# Patient Record
Sex: MALE | Race: WHITE | NOT HISPANIC OR LATINO | URBAN - METROPOLITAN AREA
[De-identification: names, ages, dates, MRNs, and addresses within clinical notes are randomized per-mention and may not be internally consistent; named-entity substitution may affect disease eponyms.]

---

## 2018-06-29 ENCOUNTER — INPATIENT (INPATIENT)
Facility: HOSPITAL | Age: 23
LOS: 7 days | Discharge: HOME | DRG: 163 | End: 2018-07-07
Attending: THORACIC SURGERY (CARDIOTHORACIC VASCULAR SURGERY) | Admitting: THORACIC SURGERY (CARDIOTHORACIC VASCULAR SURGERY)
Payer: MEDICAID

## 2018-06-29 VITALS
SYSTOLIC BLOOD PRESSURE: 130 MMHG | DIASTOLIC BLOOD PRESSURE: 70 MMHG | HEART RATE: 86 BPM | WEIGHT: 119.93 LBS | RESPIRATION RATE: 19 BRPM | OXYGEN SATURATION: 97 %

## 2018-06-29 DIAGNOSIS — J93.9 PNEUMOTHORAX, UNSPECIFIED: ICD-10-CM

## 2018-06-29 LAB
ALBUMIN SERPL ELPH-MCNC: 4.9 G/DL — SIGNIFICANT CHANGE UP (ref 3.3–5)
ALP SERPL-CCNC: 44 U/L — SIGNIFICANT CHANGE UP (ref 40–120)
ALT FLD-CCNC: 14 U/L — SIGNIFICANT CHANGE UP (ref 10–45)
ANION GAP SERPL CALC-SCNC: 12 MMOL/L — SIGNIFICANT CHANGE UP (ref 5–17)
APTT BLD: 31.2 SEC — SIGNIFICANT CHANGE UP (ref 27.5–37.4)
AST SERPL-CCNC: 17 U/L — SIGNIFICANT CHANGE UP (ref 10–40)
BASOPHILS # BLD AUTO: 0 K/UL — SIGNIFICANT CHANGE UP (ref 0–0.2)
BASOPHILS NFR BLD AUTO: 0.1 % — SIGNIFICANT CHANGE UP (ref 0–2)
BILIRUB SERPL-MCNC: 1 MG/DL — SIGNIFICANT CHANGE UP (ref 0.2–1.2)
BLD GP AB SCN SERPL QL: NEGATIVE — SIGNIFICANT CHANGE UP
BUN SERPL-MCNC: 20 MG/DL — SIGNIFICANT CHANGE UP (ref 7–23)
CALCIUM SERPL-MCNC: 9.2 MG/DL — SIGNIFICANT CHANGE UP (ref 8.4–10.5)
CHLORIDE SERPL-SCNC: 101 MMOL/L — SIGNIFICANT CHANGE UP (ref 96–108)
CO2 SERPL-SCNC: 26 MMOL/L — SIGNIFICANT CHANGE UP (ref 22–31)
CREAT SERPL-MCNC: 1.23 MG/DL — SIGNIFICANT CHANGE UP (ref 0.5–1.3)
EOSINOPHIL # BLD AUTO: 0 K/UL — SIGNIFICANT CHANGE UP (ref 0–0.5)
EOSINOPHIL NFR BLD AUTO: 0.2 % — SIGNIFICANT CHANGE UP (ref 0–6)
GLUCOSE SERPL-MCNC: 110 MG/DL — HIGH (ref 70–99)
HCT VFR BLD CALC: 44.6 % — SIGNIFICANT CHANGE UP (ref 39–50)
HGB BLD-MCNC: 15.1 G/DL — SIGNIFICANT CHANGE UP (ref 13–17)
INR BLD: 1.23 RATIO — HIGH (ref 0.88–1.16)
LYMPHOCYTES # BLD AUTO: 0.6 K/UL — LOW (ref 1–3.3)
LYMPHOCYTES # BLD AUTO: 5.3 % — LOW (ref 13–44)
MCHC RBC-ENTMCNC: 31.2 PG — SIGNIFICANT CHANGE UP (ref 27–34)
MCHC RBC-ENTMCNC: 33.9 GM/DL — SIGNIFICANT CHANGE UP (ref 32–36)
MCV RBC AUTO: 92 FL — SIGNIFICANT CHANGE UP (ref 80–100)
MONOCYTES # BLD AUTO: 0.6 K/UL — SIGNIFICANT CHANGE UP (ref 0–0.9)
MONOCYTES NFR BLD AUTO: 5.7 % — SIGNIFICANT CHANGE UP (ref 2–14)
NEUTROPHILS # BLD AUTO: 10.1 K/UL — HIGH (ref 1.8–7.4)
NEUTROPHILS NFR BLD AUTO: 88.7 % — HIGH (ref 43–77)
PLATELET # BLD AUTO: 226 K/UL — SIGNIFICANT CHANGE UP (ref 150–400)
POTASSIUM SERPL-MCNC: 4.4 MMOL/L — SIGNIFICANT CHANGE UP (ref 3.5–5.3)
POTASSIUM SERPL-SCNC: 4.4 MMOL/L — SIGNIFICANT CHANGE UP (ref 3.5–5.3)
PROT SERPL-MCNC: 7.7 G/DL — SIGNIFICANT CHANGE UP (ref 6–8.3)
PROTHROM AB SERPL-ACNC: 13.3 SEC — HIGH (ref 9.8–12.7)
RBC # BLD: 4.85 M/UL — SIGNIFICANT CHANGE UP (ref 4.2–5.8)
RBC # FLD: 11.5 % — SIGNIFICANT CHANGE UP (ref 10.3–14.5)
RH IG SCN BLD-IMP: POSITIVE — SIGNIFICANT CHANGE UP
RH IG SCN BLD-IMP: POSITIVE — SIGNIFICANT CHANGE UP
SODIUM SERPL-SCNC: 139 MMOL/L — SIGNIFICANT CHANGE UP (ref 135–145)
WBC # BLD: 11.4 K/UL — HIGH (ref 3.8–10.5)
WBC # FLD AUTO: 11.4 K/UL — HIGH (ref 3.8–10.5)

## 2018-06-29 PROCEDURE — 71045 X-RAY EXAM CHEST 1 VIEW: CPT | Mod: 26,59

## 2018-06-29 PROCEDURE — 71250 CT THORAX DX C-: CPT | Mod: 26

## 2018-06-29 PROCEDURE — 99285 EMERGENCY DEPT VISIT HI MDM: CPT | Mod: 25

## 2018-06-29 PROCEDURE — 71046 X-RAY EXAM CHEST 2 VIEWS: CPT | Mod: 26

## 2018-06-29 PROCEDURE — 32556 INSERT CATH PLEURA W/O IMAGE: CPT

## 2018-06-29 RX ORDER — MORPHINE SULFATE 50 MG/1
6 CAPSULE, EXTENDED RELEASE ORAL ONCE
Qty: 0 | Refills: 0 | Status: DISCONTINUED | OUTPATIENT
Start: 2018-06-29 | End: 2018-06-29

## 2018-06-29 RX ORDER — ACETAMINOPHEN 500 MG
650 TABLET ORAL EVERY 6 HOURS
Qty: 0 | Refills: 0 | Status: DISCONTINUED | OUTPATIENT
Start: 2018-06-29 | End: 2018-07-03

## 2018-06-29 RX ORDER — OXYCODONE HYDROCHLORIDE 5 MG/1
5 TABLET ORAL EVERY 4 HOURS
Qty: 0 | Refills: 0 | Status: DISCONTINUED | OUTPATIENT
Start: 2018-06-29 | End: 2018-06-30

## 2018-06-29 RX ORDER — ENOXAPARIN SODIUM 100 MG/ML
40 INJECTION SUBCUTANEOUS DAILY
Qty: 0 | Refills: 0 | Status: DISCONTINUED | OUTPATIENT
Start: 2018-06-29 | End: 2018-07-02

## 2018-06-29 RX ORDER — MORPHINE SULFATE 50 MG/1
4 CAPSULE, EXTENDED RELEASE ORAL ONCE
Qty: 0 | Refills: 0 | Status: DISCONTINUED | OUTPATIENT
Start: 2018-06-29 | End: 2018-06-29

## 2018-06-29 RX ORDER — ENOXAPARIN SODIUM 100 MG/ML
40 INJECTION SUBCUTANEOUS DAILY
Qty: 0 | Refills: 0 | Status: DISCONTINUED | OUTPATIENT
Start: 2018-06-29 | End: 2018-06-29

## 2018-06-29 RX ORDER — ACETAMINOPHEN 500 MG
1000 TABLET ORAL ONCE
Qty: 0 | Refills: 0 | Status: COMPLETED | OUTPATIENT
Start: 2018-06-29 | End: 2018-06-29

## 2018-06-29 RX ORDER — ENOXAPARIN SODIUM 100 MG/ML
40 INJECTION SUBCUTANEOUS EVERY 12 HOURS
Qty: 0 | Refills: 0 | Status: DISCONTINUED | OUTPATIENT
Start: 2018-06-29 | End: 2018-06-29

## 2018-06-29 RX ORDER — HYDROMORPHONE HYDROCHLORIDE 2 MG/ML
0.5 INJECTION INTRAMUSCULAR; INTRAVENOUS; SUBCUTANEOUS ONCE
Qty: 0 | Refills: 0 | Status: DISCONTINUED | OUTPATIENT
Start: 2018-06-29 | End: 2018-06-29

## 2018-06-29 RX ADMIN — Medication 1000 MILLIGRAM(S): at 10:37

## 2018-06-29 RX ADMIN — ENOXAPARIN SODIUM 40 MILLIGRAM(S): 100 INJECTION SUBCUTANEOUS at 22:28

## 2018-06-29 RX ADMIN — OXYCODONE HYDROCHLORIDE 5 MILLIGRAM(S): 5 TABLET ORAL at 23:27

## 2018-06-29 RX ADMIN — MORPHINE SULFATE 4 MILLIGRAM(S): 50 CAPSULE, EXTENDED RELEASE ORAL at 13:26

## 2018-06-29 RX ADMIN — Medication 400 MILLIGRAM(S): at 10:09

## 2018-06-29 RX ADMIN — HYDROMORPHONE HYDROCHLORIDE 0.5 MILLIGRAM(S): 2 INJECTION INTRAMUSCULAR; INTRAVENOUS; SUBCUTANEOUS at 20:59

## 2018-06-29 RX ADMIN — OXYCODONE HYDROCHLORIDE 5 MILLIGRAM(S): 5 TABLET ORAL at 22:27

## 2018-06-29 RX ADMIN — Medication 650 MILLIGRAM(S): at 17:16

## 2018-06-29 RX ADMIN — Medication 650 MILLIGRAM(S): at 17:45

## 2018-06-29 RX ADMIN — OXYCODONE HYDROCHLORIDE 5 MILLIGRAM(S): 5 TABLET ORAL at 17:45

## 2018-06-29 RX ADMIN — HYDROMORPHONE HYDROCHLORIDE 0.5 MILLIGRAM(S): 2 INJECTION INTRAMUSCULAR; INTRAVENOUS; SUBCUTANEOUS at 20:39

## 2018-06-29 RX ADMIN — OXYCODONE HYDROCHLORIDE 5 MILLIGRAM(S): 5 TABLET ORAL at 17:16

## 2018-06-29 NOTE — ED ADULT NURSE NOTE - OBJECTIVE STATEMENT
21 y/o male with PMH of spontaneous pneumothorax 4 years prior presenting to ED back & chest pain on inspiration & expiration. Pt states "i'm not sure if it's related but I got a tattoo on my back 4 days ago. my chest feels like i've just been punched. I took 162 ASA this morning, it brought my pain down from a 7 to a 4." As per EMS: pt diaphoretic on arrival of EMS.  Upon exam, pt A&Ox3, lungs cta bilaterally breath sounds diminished on left upper lobe, no difficulty speaking in complete sentences, s1s2 heart sounds heard, hesitant to take a deep breath, SPO2 98% on room air. Pt denies sob, ha, n/v/d, abdominal pain, f/c, urinary symptoms, hematuria. Awaits MD vazquez. 23 y/o male with PMH of spontaneous pneumothorax 4 years prior presenting to ED back, arm, & chest pain on inspiration & expiration x yesterday. Pt states "i'm not sure if it's related but I got a tattoo on my back 4 days ago. my chest feels like i've just been punched. I took 162 ASA this morning, it brought my pain down from a 7 to a 4." As per EMS: pt diaphoretic on arrival of EMS.  Upon exam, pt A&Ox3, lungs cta bilaterally breath sounds diminished on left upper lobe, no difficulty speaking in complete sentences, s1s2 heart sounds heard, hesitant to take a deep breath, SPO2 98% on room air. Pt denies sob, ha, n/v/d, abdominal pain, f/c, urinary symptoms, hematuria. Awaits MD vazquez.

## 2018-06-29 NOTE — ED ADULT NURSE REASSESSMENT NOTE - NS ED NURSE REASSESS COMMENT FT1
Pt remains stable S/P chest tube insertion by MD. Non-rebreather placed on patient during procedure, removed post procedure SPO2 100% on room air. Chest tube water seal chamber bubbling, connected to low wall suction. Awaits MD dispo.

## 2018-06-29 NOTE — ED ADULT NURSE REASSESSMENT NOTE - NS ED NURSE REASSESS COMMENT FT1
Pt remains stable in ED. Medicated for pain. Admitted to medicine for pneumothorax on left. Awaits bed.

## 2018-06-29 NOTE — H&P ADULT - HISTORY OF PRESENT ILLNESS
22F only PMH spontaneous pneumothorax in 2014 in [place] treated at the time with Heimlich valve who presents with left chest and back pain since yesterday. It started suddenly, is sharp and worsens with deep breaths. He denies trauma. He denies fever/chills. CXR in ED found moderate L pneumothorax. Previous workup did not reveal an obvious cause. He denies family hx. He does not smoke.  ED placed a pigtail with small residual apical pneumothorax.

## 2018-06-29 NOTE — ED PROVIDER NOTE - PROGRESS NOTE DETAILS
James Whatley PGY1: CXR significant for L upper pneumothorax, d/w patient agreed to pigtail catheter; consulted surgery James Whatley PGY1: pigtail catheter placed, post-procedural cxr shows interval improvement of pneumothorax; paged surgery James Whatley PGY1: admitted to surgery

## 2018-06-29 NOTE — H&P ADULT - NSHPLABSRESULTS_GEN_ALL_CORE
CBC Full  -  ( 29 Jun 2018 10:56 )  WBC Count : 11.4 K/uL  Hemoglobin : 15.1 g/dL  Hematocrit : 44.6 %  Platelet Count - Automated : 226 K/uL  Mean Cell Volume : 92.0 fl  Mean Cell Hemoglobin : 31.2 pg  Mean Cell Hemoglobin Concentration : 33.9 gm/dL  Auto Neutrophil # : 10.1 K/uL  Auto Lymphocyte # : 0.6 K/uL  Auto Monocyte # : 0.6 K/uL  Auto Eosinophil # : 0.0 K/uL  Auto Basophil # : 0.0 K/uL  Auto Neutrophil % : 88.7 %  Auto Lymphocyte % : 5.3 %  Auto Monocyte % : 5.7 %  Auto Eosinophil % : 0.2 %  Auto Basophil % : 0.1 %    06-29    139  |  101  |  20  ----------------------------<  110<H>  4.4   |  26  |  1.23    Ca    9.2      29 Jun 2018 10:56    TPro  7.7  /  Alb  4.9  /  TBili  1.0  /  DBili  x   /  AST  17  /  ALT  14  /  AlkPhos  44  06-29    < from: Xray Chest 1 View- PORTABLE-Urgent (06.29.18 @ 13:00) >    INTERPRETATION:    A single chest x-ray was obtained on June 29, 2018.    Indication: Status post chest tube placement.    Impression:    The heartis normal in size. A pigtail catheter was placed on the left   and there is reexpansion of the left pneumothorax. Residual apical   pneumothorax is still present. The right lung is clear.      < end of copied text >

## 2018-06-29 NOTE — ED PROVIDER NOTE - MEDICAL DECISION MAKING DETAILS
23 yo M w/ sob, chest pain consistent w/ prior episode spontaneous pneumothorax; vss, will check cxr, draw preop labs, give sypmtomatic relief, reassess

## 2018-06-29 NOTE — ED PROVIDER NOTE - PHYSICAL EXAMINATION
Jerez:  General: No distress.  Mentation at baseline.   HEENT: WNL  Chest/Lungs: CTAB, No wheeze, No retractions, No increased work of breathing, Normal rate  Heart: S1S2 RRR, No M/R/G, Pules equal Bilaterally in upper and lower extremities distally  Abd: soft, NT/ND, No guarding, No rebound.  No hernias, no palpable masses.  Extrem: FROM in all joints, no significant edema noted, No ulcers.  Cap refil < 2sec.  Skin: No rash noted, warm dry.  Neuro:  Grossly normal.  No difficulty ambulating. No focal deficits.  Psychiatric: No evidence of delusions. No SI/HI. Jerez:  General: No distress.  Mentation at baseline.   HEENT: WNL  Chest/Lungs: decreased breath sounds in left upper chest, No wheeze, No retractions, No increased work of breathing, Normal rate  Heart: S1S2 RRR, No M/R/G, Pules equal Bilaterally in upper and lower extremities distally  Abd: soft, NT/ND, No guarding, No rebound.  No hernias, no palpable masses.  Extrem: FROM in all joints, no significant edema noted, No ulcers.  Cap refil < 2sec.  Skin: No rash noted, warm dry.  Neuro:  Grossly normal.  No difficulty ambulating. No focal deficits.  Psychiatric: No evidence of delusions. No SI/HI.

## 2018-06-29 NOTE — H&P ADULT - NSHPPHYSICALEXAM_GEN_ALL_CORE
NAD, awake and alert  Respirations nonlabored at rest  chest wall nontender  Diminished breath sounds L upper  Abdomen soft, nontender, nondistended  No guarding or rebound tenderness   Extremities warm

## 2018-06-29 NOTE — H&P ADULT - ASSESSMENT
22M w/ recurrent spontaneous pneumothorax    - admit to Thoracic surgery  - pigtail to LCWS  - pain control  - incentive spirometry  - VTE ppx  - can have diet for now  - will eval for pleurodesis  - AM CXR  - d/w Dr. Sidra Cleveland MD  [id number]

## 2018-06-29 NOTE — ED PROVIDER NOTE - ATTENDING CONTRIBUTION TO CARE
Solitario:  I have independently evaluated the patient and have documented in the appropriate sections above.  I agree with the exam and plan as noted above.

## 2018-06-29 NOTE — ED PROVIDER NOTE - OBJECTIVE STATEMENT
Jerez: here with chest pain and hx of spontaneous pneumothorax  pain with deep breath Jerez: here with chest pain and hx of spontaneous pneumothorax  pain with deep breath    23 yo M w/ pmh spontaneous pneumothorax (2014) p/w L upper back pain radiating to L chest that started 2pm yesterday and has been worsening, w/ sob. Denies cough, fever, recent travel. No trauma to area, increased exertion. No family hx of pneumothorax.

## 2018-06-29 NOTE — ED PROVIDER NOTE - CADM POA PRESS ULCER
Vaccine Information Statement(s) for was given today. This has been reviewed, questions answered, and verbal consent given by Patient for injection(s) and administration of Anthrax and Influenza (Inactivated).    1. Does the patient have a moderate to severe fever?  No  2. Has the patient had a serious reaction to a flu shot before?   No  3. Has the patient ever had [place] Syndrome within 6 weeks of a previous flu shot?  No  4. Does the patient have a serious allergy to eggs?  No  5. Is the patient less that 6 months of age?  No    Patient is eligible to receive the vaccine based on all questions being answered as 'No'.          Patient tolerated without incident. See immunization grid for documentation.   No

## 2018-06-30 DIAGNOSIS — J93.83 OTHER PNEUMOTHORAX: ICD-10-CM

## 2018-06-30 LAB
ANION GAP SERPL CALC-SCNC: 12 MMOL/L — SIGNIFICANT CHANGE UP (ref 5–17)
BUN SERPL-MCNC: 16 MG/DL — SIGNIFICANT CHANGE UP (ref 7–23)
CALCIUM SERPL-MCNC: 9.2 MG/DL — SIGNIFICANT CHANGE UP (ref 8.4–10.5)
CHLORIDE SERPL-SCNC: 100 MMOL/L — SIGNIFICANT CHANGE UP (ref 96–108)
CO2 SERPL-SCNC: 25 MMOL/L — SIGNIFICANT CHANGE UP (ref 22–31)
CREAT SERPL-MCNC: 1.39 MG/DL — HIGH (ref 0.5–1.3)
GLUCOSE SERPL-MCNC: 99 MG/DL — SIGNIFICANT CHANGE UP (ref 70–99)
HCT VFR BLD CALC: 38.2 % — LOW (ref 39–50)
HGB BLD-MCNC: 13.5 G/DL — SIGNIFICANT CHANGE UP (ref 13–17)
MAGNESIUM SERPL-MCNC: 1.8 MG/DL — SIGNIFICANT CHANGE UP (ref 1.6–2.6)
MCHC RBC-ENTMCNC: 31.9 PG — SIGNIFICANT CHANGE UP (ref 27–34)
MCHC RBC-ENTMCNC: 35.3 GM/DL — SIGNIFICANT CHANGE UP (ref 32–36)
MCV RBC AUTO: 90.3 FL — SIGNIFICANT CHANGE UP (ref 80–100)
PHOSPHATE SERPL-MCNC: 1.7 MG/DL — LOW (ref 2.5–4.5)
PLATELET # BLD AUTO: 201 K/UL — SIGNIFICANT CHANGE UP (ref 150–400)
POTASSIUM SERPL-MCNC: 4 MMOL/L — SIGNIFICANT CHANGE UP (ref 3.5–5.3)
POTASSIUM SERPL-SCNC: 4 MMOL/L — SIGNIFICANT CHANGE UP (ref 3.5–5.3)
RBC # BLD: 4.23 M/UL — SIGNIFICANT CHANGE UP (ref 4.2–5.8)
RBC # FLD: 12.7 % — SIGNIFICANT CHANGE UP (ref 10.3–14.5)
SODIUM SERPL-SCNC: 137 MMOL/L — SIGNIFICANT CHANGE UP (ref 135–145)
WBC # BLD: 10.6 K/UL — HIGH (ref 3.8–10.5)
WBC # FLD AUTO: 10.6 K/UL — HIGH (ref 3.8–10.5)

## 2018-06-30 PROCEDURE — 71045 X-RAY EXAM CHEST 1 VIEW: CPT | Mod: 26

## 2018-06-30 RX ORDER — ACETAMINOPHEN 500 MG
650 TABLET ORAL EVERY 6 HOURS
Qty: 0 | Refills: 0 | Status: DISCONTINUED | OUTPATIENT
Start: 2018-06-30 | End: 2018-07-03

## 2018-06-30 RX ORDER — HYDROMORPHONE HYDROCHLORIDE 2 MG/ML
0.5 INJECTION INTRAMUSCULAR; INTRAVENOUS; SUBCUTANEOUS ONCE
Qty: 0 | Refills: 0 | Status: DISCONTINUED | OUTPATIENT
Start: 2018-06-30 | End: 2018-06-30

## 2018-06-30 RX ORDER — OXYCODONE HYDROCHLORIDE 5 MG/1
10 TABLET ORAL EVERY 4 HOURS
Qty: 0 | Refills: 0 | Status: DISCONTINUED | OUTPATIENT
Start: 2018-06-30 | End: 2018-07-03

## 2018-06-30 RX ADMIN — HYDROMORPHONE HYDROCHLORIDE 0.5 MILLIGRAM(S): 2 INJECTION INTRAMUSCULAR; INTRAVENOUS; SUBCUTANEOUS at 08:10

## 2018-06-30 RX ADMIN — OXYCODONE HYDROCHLORIDE 10 MILLIGRAM(S): 5 TABLET ORAL at 11:33

## 2018-06-30 RX ADMIN — OXYCODONE HYDROCHLORIDE 10 MILLIGRAM(S): 5 TABLET ORAL at 22:25

## 2018-06-30 RX ADMIN — OXYCODONE HYDROCHLORIDE 10 MILLIGRAM(S): 5 TABLET ORAL at 18:00

## 2018-06-30 RX ADMIN — OXYCODONE HYDROCHLORIDE 5 MILLIGRAM(S): 5 TABLET ORAL at 04:41

## 2018-06-30 RX ADMIN — OXYCODONE HYDROCHLORIDE 5 MILLIGRAM(S): 5 TABLET ORAL at 08:20

## 2018-06-30 RX ADMIN — OXYCODONE HYDROCHLORIDE 10 MILLIGRAM(S): 5 TABLET ORAL at 21:41

## 2018-06-30 RX ADMIN — OXYCODONE HYDROCHLORIDE 5 MILLIGRAM(S): 5 TABLET ORAL at 07:51

## 2018-06-30 RX ADMIN — OXYCODONE HYDROCHLORIDE 10 MILLIGRAM(S): 5 TABLET ORAL at 17:27

## 2018-06-30 RX ADMIN — OXYCODONE HYDROCHLORIDE 10 MILLIGRAM(S): 5 TABLET ORAL at 12:00

## 2018-06-30 RX ADMIN — HYDROMORPHONE HYDROCHLORIDE 0.5 MILLIGRAM(S): 2 INJECTION INTRAMUSCULAR; INTRAVENOUS; SUBCUTANEOUS at 08:25

## 2018-06-30 RX ADMIN — OXYCODONE HYDROCHLORIDE 5 MILLIGRAM(S): 5 TABLET ORAL at 05:48

## 2018-06-30 RX ADMIN — Medication 650 MILLIGRAM(S): at 21:57

## 2018-06-30 RX ADMIN — ENOXAPARIN SODIUM 40 MILLIGRAM(S): 100 INJECTION SUBCUTANEOUS at 11:29

## 2018-06-30 NOTE — PROGRESS NOTE ADULT - SUBJECTIVE AND OBJECTIVE BOX
VITAL SIGNS    Vital Signs Last 24 Hrs  T(C): 37.7 (06-30-18 @ 04:08), Max: 37.8 (06-29-18 @ 21:23)  T(F): 99.8 (06-30-18 @ 04:08), Max: 100 (06-29-18 @ 21:23)  HR: 89 (06-30-18 @ 04:08) (70 - 89)  BP: 130/67 (06-30-18 @ 04:08) (107/64 - 130/67)  RR: 20 (06-30-18 @ 04:08) (19 - 24)  SpO2: 96% (06-30-18 @ 04:08) (92% - 99%)            06-29 @ 07:01  -  06-30 @ 07:00  --------------------------------------------------------  IN: 480 mL / OUT: 653 mL / NET: -173 mL       Daily Height in cm: 172.72 (29 Jun 2018 16:44)    Daily   Admit Wt: Drug Dosing Weight  Height (cm): 172.72 (29 Jun 2018 16:44)  Weight (kg): 67.4 (29 Jun 2018 16:44)  BMI (kg/m2): 22.6 (29 Jun 2018 16:44)  BSA (m2): 1.8 (29 Jun 2018 16:44)    Bilirubin Total, Serum: 1.0 mg/dL (06-29 @ 10:56)    MEDICATIONS  acetaminophen   Tablet. 650 milliGRAM(s) Oral every 6 hours PRN  enoxaparin Injectable 40 milliGRAM(s) SubCutaneous daily  oxyCODONE    IR 10 milliGRAM(s) Oral every 4 hours PRN    PHYSICAL EXAM  Subjective: c/o pain to chest tube site, lws no airleak  Neurology: alert and oriented x 3, nonfocal, no gross deficits  CV : CTS  Sternal Wound :  CDI , Stable  Lungs:  Abdomen: soft, NT,ND, ( )BM  :  voiding  Extremities:       LABS  06-30    137  |  100  |  16  ----------------------------<  99  4.0   |  25  |  1.39<H>    Ca    9.2      30 Jun 2018 05:41  Phos  1.7     06-30  Mg     1.8     06-30    TPro  7.7  /  Alb  4.9  /  TBili  1.0  /  DBili  x   /  AST  17  /  ALT  14  /  AlkPhos  44  06-29                                 13.5   10.60 )-----------( 201      ( 30 Jun 2018 08:39 )             38.2          PT/INR - ( 29 Jun 2018 10:56 )   PT: 13.3 sec;   INR: 1.23 ratio         PTT - ( 29 Jun 2018 10:56 )  PTT:31.2 sec       PAST MEDICAL & SURGICAL HISTORY:  Pneumothorax  No significant past surgical history VITAL SIGNS    Vital Signs Last 24 Hrs  T(C): 37.7 (06-30-18 @ 04:08), Max: 37.8 (06-29-18 @ 21:23)  T(F): 99.8 (06-30-18 @ 04:08), Max: 100 (06-29-18 @ 21:23)  HR: 89 (06-30-18 @ 04:08) (70 - 89)  BP: 130/67 (06-30-18 @ 04:08) (107/64 - 130/67)  RR: 20 (06-30-18 @ 04:08) (19 - 24)  SpO2: 96% (06-30-18 @ 04:08) (92% - 99%)            06-29 @ 07:01  -  06-30 @ 07:00  --------------------------------------------------------  IN: 480 mL / OUT: 653 mL / NET: -173 mL       Daily Height in cm: 172.72 (29 Jun 2018 16:44)    Daily   Admit Wt: Drug Dosing Weight  Height (cm): 172.72 (29 Jun 2018 16:44)  Weight (kg): 67.4 (29 Jun 2018 16:44)  BMI (kg/m2): 22.6 (29 Jun 2018 16:44)  BSA (m2): 1.8 (29 Jun 2018 16:44)    Bilirubin Total, Serum: 1.0 mg/dL (06-29 @ 10:56)    MEDICATIONS  acetaminophen   Tablet. 650 milliGRAM(s) Oral every 6 hours PRN  enoxaparin Injectable 40 milliGRAM(s) SubCutaneous daily  oxyCODONE    IR 10 milliGRAM(s) Oral every 4 hours PRN    PHYSICAL EXAM  Subjective: c/o pain to chest tube site, lws no airleak  Neurology: alert and oriented x 3, nonfocal, no gross deficits  CV :S1S2  Lungs: CTA   Abdomen: soft, NT,ND, (+)BM  :  voiding  Extremities: -c/c/e      LABS  06-30    137  |  100  |  16  ----------------------------<  99  4.0   |  25  |  1.39<H>    Ca    9.2      30 Jun 2018 05:41  Phos  1.7     06-30  Mg     1.8     06-30    TPro  7.7  /  Alb  4.9  /  TBili  1.0  /  DBili  x   /  AST  17  /  ALT  14  /  AlkPhos  44  06-29                                 13.5   10.60 )-----------( 201      ( 30 Jun 2018 08:39 )             38.2          PT/INR - ( 29 Jun 2018 10:56 )   PT: 13.3 sec;   INR: 1.23 ratio         PTT - ( 29 Jun 2018 10:56 )  PTT:31.2 sec       PAST MEDICAL & SURGICAL HISTORY:  Pneumothorax  No significant past surgical history

## 2018-07-01 LAB
ANION GAP SERPL CALC-SCNC: 14 MMOL/L — SIGNIFICANT CHANGE UP (ref 5–17)
BUN SERPL-MCNC: 18 MG/DL — SIGNIFICANT CHANGE UP (ref 7–23)
CALCIUM SERPL-MCNC: 8.9 MG/DL — SIGNIFICANT CHANGE UP (ref 8.4–10.5)
CHLORIDE SERPL-SCNC: 97 MMOL/L — SIGNIFICANT CHANGE UP (ref 96–108)
CO2 SERPL-SCNC: 27 MMOL/L — SIGNIFICANT CHANGE UP (ref 22–31)
CREAT SERPL-MCNC: 1.4 MG/DL — HIGH (ref 0.5–1.3)
GLUCOSE SERPL-MCNC: 94 MG/DL — SIGNIFICANT CHANGE UP (ref 70–99)
HCT VFR BLD CALC: 39.7 % — SIGNIFICANT CHANGE UP (ref 39–50)
HGB BLD-MCNC: 14 G/DL — SIGNIFICANT CHANGE UP (ref 13–17)
MCHC RBC-ENTMCNC: 32.9 PG — SIGNIFICANT CHANGE UP (ref 27–34)
MCHC RBC-ENTMCNC: 35.4 GM/DL — SIGNIFICANT CHANGE UP (ref 32–36)
MCV RBC AUTO: 93.2 FL — SIGNIFICANT CHANGE UP (ref 80–100)
PLATELET # BLD AUTO: 203 K/UL — SIGNIFICANT CHANGE UP (ref 150–400)
POTASSIUM SERPL-MCNC: 4.4 MMOL/L — SIGNIFICANT CHANGE UP (ref 3.5–5.3)
POTASSIUM SERPL-SCNC: 4.4 MMOL/L — SIGNIFICANT CHANGE UP (ref 3.5–5.3)
RBC # BLD: 4.26 M/UL — SIGNIFICANT CHANGE UP (ref 4.2–5.8)
RBC # FLD: 11.3 % — SIGNIFICANT CHANGE UP (ref 10.3–14.5)
SODIUM SERPL-SCNC: 138 MMOL/L — SIGNIFICANT CHANGE UP (ref 135–145)
WBC # BLD: 9 K/UL — SIGNIFICANT CHANGE UP (ref 3.8–10.5)
WBC # FLD AUTO: 9 K/UL — SIGNIFICANT CHANGE UP (ref 3.8–10.5)

## 2018-07-01 PROCEDURE — 71045 X-RAY EXAM CHEST 1 VIEW: CPT | Mod: 26

## 2018-07-01 RX ORDER — SODIUM,POTASSIUM PHOSPHATES 278-250MG
1 POWDER IN PACKET (EA) ORAL
Qty: 0 | Refills: 0 | Status: COMPLETED | OUTPATIENT
Start: 2018-07-01 | End: 2018-07-02

## 2018-07-01 RX ADMIN — Medication 1 TABLET(S): at 21:17

## 2018-07-01 RX ADMIN — ENOXAPARIN SODIUM 40 MILLIGRAM(S): 100 INJECTION SUBCUTANEOUS at 11:49

## 2018-07-01 RX ADMIN — OXYCODONE HYDROCHLORIDE 10 MILLIGRAM(S): 5 TABLET ORAL at 18:24

## 2018-07-01 RX ADMIN — Medication 1 TABLET(S): at 18:02

## 2018-07-01 RX ADMIN — OXYCODONE HYDROCHLORIDE 10 MILLIGRAM(S): 5 TABLET ORAL at 06:52

## 2018-07-01 RX ADMIN — OXYCODONE HYDROCHLORIDE 10 MILLIGRAM(S): 5 TABLET ORAL at 15:00

## 2018-07-01 RX ADMIN — OXYCODONE HYDROCHLORIDE 10 MILLIGRAM(S): 5 TABLET ORAL at 06:32

## 2018-07-01 RX ADMIN — OXYCODONE HYDROCHLORIDE 10 MILLIGRAM(S): 5 TABLET ORAL at 14:34

## 2018-07-01 RX ADMIN — OXYCODONE HYDROCHLORIDE 10 MILLIGRAM(S): 5 TABLET ORAL at 23:44

## 2018-07-01 RX ADMIN — OXYCODONE HYDROCHLORIDE 10 MILLIGRAM(S): 5 TABLET ORAL at 23:14

## 2018-07-01 RX ADMIN — OXYCODONE HYDROCHLORIDE 10 MILLIGRAM(S): 5 TABLET ORAL at 11:30

## 2018-07-01 RX ADMIN — OXYCODONE HYDROCHLORIDE 10 MILLIGRAM(S): 5 TABLET ORAL at 10:55

## 2018-07-01 RX ADMIN — OXYCODONE HYDROCHLORIDE 10 MILLIGRAM(S): 5 TABLET ORAL at 19:00

## 2018-07-01 NOTE — PROGRESS NOTE ADULT - SUBJECTIVE AND OBJECTIVE BOX
VITAL SIGNS    Telemetry:    Vital Signs Last 24 Hrs  T(C): 37.8 (18 @ 13:29), Max: 38.1 (18 @ 20:50)  T(F): 100.1 (18 @ 13:29), Max: 100.5 (18 @ 20:50)  HR: 77 (18 @ 13:29) (77 - 90)  BP: 117/73 (18 @ 13:29) (106/63 - 124/68)  RR: 17 (18 @ 13:29) (17 - 18)  SpO2: 96% (18 @ 13:29) (93% - 96%)             @ 07:01  -   @ 07:00  --------------------------------------------------------  IN: 480 mL / OUT: 7 mL / NET: 473 mL       Daily     Daily Weight in k.5 (2018 12:41)  Admit Wt: Drug Dosing Weight  Height (cm): 172.72 (2018 16:44)  Weight (kg): 67.4 (2018 16:44)  BMI (kg/m2): 22.6 (2018 16:44)  BSA (m2): 1.8 (2018 16:44)      CAPILLARY BLOOD GLUCOSE              MEDICATIONS  acetaminophen   Tablet 650 milliGRAM(s) Oral every 6 hours PRN  acetaminophen   Tablet. 650 milliGRAM(s) Oral every 6 hours PRN  enoxaparin Injectable 40 milliGRAM(s) SubCutaneous daily  oxyCODONE    IR 10 milliGRAM(s) Oral every 4 hours PRN      PHYSICAL EXAM  Subjective: NAD  Neurology: alert and oriented x 3, nonfocal, no gross deficits  CV : S1S2  Lungs: left pigtail to lws  Abdomen: soft, NT,ND, (+ )BM  :  voiding  Extremities: -c/c/e      LABS      138  |  97  |  18  ----------------------------<  94  4.4   |  27  |  1.40<H>    Ca    8.9      2018 06:20  Phos  1.7     06-30  Mg     1.8     06-30                                   14.0   9.0   )-----------( 203      ( 2018 06:20 )             39.7                 PAST MEDICAL & SURGICAL HISTORY:  Pneumothorax  No significant past surgical history

## 2018-07-02 ENCOUNTER — TRANSCRIPTION ENCOUNTER (OUTPATIENT)
Age: 23
End: 2018-07-02

## 2018-07-02 LAB
ANION GAP SERPL CALC-SCNC: 12 MMOL/L — SIGNIFICANT CHANGE UP (ref 5–17)
APPEARANCE UR: CLEAR — SIGNIFICANT CHANGE UP
BILIRUB UR-MCNC: NEGATIVE — SIGNIFICANT CHANGE UP
BLD GP AB SCN SERPL QL: NEGATIVE — SIGNIFICANT CHANGE UP
BUN SERPL-MCNC: 16 MG/DL — SIGNIFICANT CHANGE UP (ref 7–23)
CALCIUM SERPL-MCNC: 8.5 MG/DL — SIGNIFICANT CHANGE UP (ref 8.4–10.5)
CHLORIDE SERPL-SCNC: 97 MMOL/L — SIGNIFICANT CHANGE UP (ref 96–108)
CO2 SERPL-SCNC: 28 MMOL/L — SIGNIFICANT CHANGE UP (ref 22–31)
COLOR SPEC: YELLOW — SIGNIFICANT CHANGE UP
CREAT SERPL-MCNC: 1.35 MG/DL — HIGH (ref 0.5–1.3)
DIFF PNL FLD: NEGATIVE — SIGNIFICANT CHANGE UP
GLUCOSE SERPL-MCNC: 95 MG/DL — SIGNIFICANT CHANGE UP (ref 70–99)
GLUCOSE UR QL: NEGATIVE — SIGNIFICANT CHANGE UP
HCT VFR BLD CALC: 36.7 % — LOW (ref 39–50)
HGB BLD-MCNC: 12.9 G/DL — LOW (ref 13–17)
KETONES UR-MCNC: NEGATIVE — SIGNIFICANT CHANGE UP
LEUKOCYTE ESTERASE UR-ACNC: NEGATIVE — SIGNIFICANT CHANGE UP
MCHC RBC-ENTMCNC: 32.4 PG — SIGNIFICANT CHANGE UP (ref 27–34)
MCHC RBC-ENTMCNC: 35.1 GM/DL — SIGNIFICANT CHANGE UP (ref 32–36)
MCV RBC AUTO: 92.5 FL — SIGNIFICANT CHANGE UP (ref 80–100)
NITRITE UR-MCNC: NEGATIVE — SIGNIFICANT CHANGE UP
PH UR: 6.5 — SIGNIFICANT CHANGE UP (ref 5–8)
PHOSPHATE SERPL-MCNC: 3.2 MG/DL — SIGNIFICANT CHANGE UP (ref 2.5–4.5)
PHOSPHATE SERPL-MCNC: 3.3 MG/DL — SIGNIFICANT CHANGE UP (ref 2.5–4.5)
PLATELET # BLD AUTO: 217 K/UL — SIGNIFICANT CHANGE UP (ref 150–400)
POTASSIUM SERPL-MCNC: 4.2 MMOL/L — SIGNIFICANT CHANGE UP (ref 3.5–5.3)
POTASSIUM SERPL-SCNC: 4.2 MMOL/L — SIGNIFICANT CHANGE UP (ref 3.5–5.3)
PROT UR-MCNC: NEGATIVE — SIGNIFICANT CHANGE UP
RBC # BLD: 3.97 M/UL — LOW (ref 4.2–5.8)
RBC # FLD: 11 % — SIGNIFICANT CHANGE UP (ref 10.3–14.5)
RH IG SCN BLD-IMP: POSITIVE — SIGNIFICANT CHANGE UP
SODIUM SERPL-SCNC: 137 MMOL/L — SIGNIFICANT CHANGE UP (ref 135–145)
SP GR SPEC: 1.01 — SIGNIFICANT CHANGE UP (ref 1.01–1.02)
UROBILINOGEN FLD QL: 4 MG/DL
WBC # BLD: 8 K/UL — SIGNIFICANT CHANGE UP (ref 3.8–10.5)
WBC # FLD AUTO: 8 K/UL — SIGNIFICANT CHANGE UP (ref 3.8–10.5)

## 2018-07-02 RX ADMIN — OXYCODONE HYDROCHLORIDE 10 MILLIGRAM(S): 5 TABLET ORAL at 15:00

## 2018-07-02 RX ADMIN — OXYCODONE HYDROCHLORIDE 10 MILLIGRAM(S): 5 TABLET ORAL at 08:26

## 2018-07-02 RX ADMIN — OXYCODONE HYDROCHLORIDE 10 MILLIGRAM(S): 5 TABLET ORAL at 20:15

## 2018-07-02 RX ADMIN — ENOXAPARIN SODIUM 40 MILLIGRAM(S): 100 INJECTION SUBCUTANEOUS at 11:30

## 2018-07-02 RX ADMIN — OXYCODONE HYDROCHLORIDE 10 MILLIGRAM(S): 5 TABLET ORAL at 19:40

## 2018-07-02 RX ADMIN — Medication 1 TABLET(S): at 11:30

## 2018-07-02 RX ADMIN — OXYCODONE HYDROCHLORIDE 10 MILLIGRAM(S): 5 TABLET ORAL at 09:00

## 2018-07-02 RX ADMIN — Medication 1 TABLET(S): at 07:44

## 2018-07-02 RX ADMIN — OXYCODONE HYDROCHLORIDE 10 MILLIGRAM(S): 5 TABLET ORAL at 14:35

## 2018-07-02 NOTE — PROGRESS NOTE ADULT - SUBJECTIVE AND OBJECTIVE BOX
VITAL SIGNS    Vital Signs Last 24 Hrs  T(C): 37.2 (18 @ 04:46), Max: 37.8 (18 @ 23:55)  T(F): 99 (18 @ 04:46), Max: 100.1 (18 @ 23:55)  HR: 72 (18 @ 04:46) (72 - 92)  BP: 102/64 (18 @ 04:46) (102/64 - 116/68)  RR: 18 (18 @ 04:46) (18 - 18)  SpO2: 99% (18 @ 04:46) (93% - 99%)             @ 07:  -   @ 07:00  --------------------------------------------------------  IN: 1077 mL / OUT: 1125 mL / NET: -48 mL     @ :  -   @ 13:41  --------------------------------------------------------  IN: 240 mL / OUT: 0 mL / NET: 240 mL     Daily Weight in k (2018 08:24)  Admit Wt: Drug Dosing Weight  Height (cm): 172.72 (2018 16:44)  Weight (kg): 67.4 (2018 16:44)  BMI (kg/m2): 22.6 (2018 16:44)  BSA (m2): 1.8 (2018 16:44)    MEDICATIONS  acetaminophen   Tablet 650 milliGRAM(s) Oral every 6 hours PRN  acetaminophen   Tablet. 650 milliGRAM(s) Oral every 6 hours PRN  enoxaparin Injectable 40 milliGRAM(s) SubCutaneous daily  oxyCODONE    IR 10 milliGRAM(s) Oral every 4 hours PRN      PHYSICAL EXAM  Subjective: NAD  Neurology: alert and oriented x 3, nonfocal, no gross deficits  CV : S1S2  Lungs: CTA left chest tube to lws  Abdomen: soft, NT,ND, (+)BM  :  voiding  Extremities: -c/c/e     LABS  -    137  |  97  |  16  ----------------------------<  95  4.2   |  28  |  1.35<H>    Ca    8.5      2018 05:54  Phos  3.2     -                                   12.9   8.0   )-----------( 217      ( 2018 05:54 )             36.7                 PAST MEDICAL & SURGICAL HISTORY:  Pneumothorax  No significant past surgical history

## 2018-07-03 ENCOUNTER — APPOINTMENT (OUTPATIENT)
Dept: THORACIC SURGERY | Facility: HOSPITAL | Age: 23
End: 2018-07-03

## 2018-07-03 ENCOUNTER — RESULT REVIEW (OUTPATIENT)
Age: 23
End: 2018-07-03

## 2018-07-03 LAB
ANION GAP SERPL CALC-SCNC: 9 MMOL/L — SIGNIFICANT CHANGE UP (ref 5–17)
BUN SERPL-MCNC: 13 MG/DL — SIGNIFICANT CHANGE UP (ref 7–23)
CALCIUM SERPL-MCNC: 8.8 MG/DL — SIGNIFICANT CHANGE UP (ref 8.4–10.5)
CHLORIDE SERPL-SCNC: 99 MMOL/L — SIGNIFICANT CHANGE UP (ref 96–108)
CO2 SERPL-SCNC: 31 MMOL/L — SIGNIFICANT CHANGE UP (ref 22–31)
CREAT SERPL-MCNC: 1.26 MG/DL — SIGNIFICANT CHANGE UP (ref 0.5–1.3)
GLUCOSE SERPL-MCNC: 93 MG/DL — SIGNIFICANT CHANGE UP (ref 70–99)
HCT VFR BLD CALC: 36.6 % — LOW (ref 39–50)
HGB BLD-MCNC: 13.2 G/DL — SIGNIFICANT CHANGE UP (ref 13–17)
MCHC RBC-ENTMCNC: 33.7 PG — SIGNIFICANT CHANGE UP (ref 27–34)
MCHC RBC-ENTMCNC: 36.1 GM/DL — HIGH (ref 32–36)
MCV RBC AUTO: 93.4 FL — SIGNIFICANT CHANGE UP (ref 80–100)
PLATELET # BLD AUTO: 259 K/UL — SIGNIFICANT CHANGE UP (ref 150–400)
POTASSIUM SERPL-MCNC: 4 MMOL/L — SIGNIFICANT CHANGE UP (ref 3.5–5.3)
POTASSIUM SERPL-SCNC: 4 MMOL/L — SIGNIFICANT CHANGE UP (ref 3.5–5.3)
RBC # BLD: 3.92 M/UL — LOW (ref 4.2–5.8)
RBC # FLD: 11 % — SIGNIFICANT CHANGE UP (ref 10.3–14.5)
SODIUM SERPL-SCNC: 139 MMOL/L — SIGNIFICANT CHANGE UP (ref 135–145)
WBC # BLD: 6.1 K/UL — SIGNIFICANT CHANGE UP (ref 3.8–10.5)
WBC # FLD AUTO: 6.1 K/UL — SIGNIFICANT CHANGE UP (ref 3.8–10.5)

## 2018-07-03 PROCEDURE — 71045 X-RAY EXAM CHEST 1 VIEW: CPT | Mod: 26

## 2018-07-03 PROCEDURE — 88309 TISSUE EXAM BY PATHOLOGIST: CPT | Mod: 26

## 2018-07-03 PROCEDURE — 32666 THORACOSCOPY W/WEDGE RESECT: CPT

## 2018-07-03 PROCEDURE — 32650 THORACOSCOPY W/PLEURODESIS: CPT

## 2018-07-03 PROCEDURE — 99231 SBSQ HOSP IP/OBS SF/LOW 25: CPT

## 2018-07-03 RX ORDER — OXYCODONE HYDROCHLORIDE 5 MG/1
10 TABLET ORAL EVERY 6 HOURS
Qty: 0 | Refills: 0 | Status: DISCONTINUED | OUTPATIENT
Start: 2018-07-03 | End: 2018-07-04

## 2018-07-03 RX ORDER — ONDANSETRON 8 MG/1
4 TABLET, FILM COATED ORAL EVERY 4 HOURS
Qty: 0 | Refills: 0 | Status: DISCONTINUED | OUTPATIENT
Start: 2018-07-03 | End: 2018-07-04

## 2018-07-03 RX ORDER — OXYCODONE HYDROCHLORIDE 5 MG/1
5 TABLET ORAL EVERY 6 HOURS
Qty: 0 | Refills: 0 | Status: DISCONTINUED | OUTPATIENT
Start: 2018-07-03 | End: 2018-07-04

## 2018-07-03 RX ORDER — SODIUM CHLORIDE 9 MG/ML
1000 INJECTION, SOLUTION INTRAVENOUS
Qty: 0 | Refills: 0 | Status: DISCONTINUED | OUTPATIENT
Start: 2018-07-03 | End: 2018-07-04

## 2018-07-03 RX ORDER — HYDROMORPHONE HYDROCHLORIDE 2 MG/ML
0.31 INJECTION INTRAMUSCULAR; INTRAVENOUS; SUBCUTANEOUS
Qty: 0 | Refills: 0 | Status: DISCONTINUED | OUTPATIENT
Start: 2018-07-03 | End: 2018-07-04

## 2018-07-03 RX ORDER — ENOXAPARIN SODIUM 100 MG/ML
40 INJECTION SUBCUTANEOUS DAILY
Qty: 0 | Refills: 0 | Status: DISCONTINUED | OUTPATIENT
Start: 2018-07-03 | End: 2018-07-07

## 2018-07-03 RX ORDER — ACETAMINOPHEN 500 MG
650 TABLET ORAL EVERY 6 HOURS
Qty: 0 | Refills: 0 | Status: DISCONTINUED | OUTPATIENT
Start: 2018-07-03 | End: 2018-07-07

## 2018-07-03 RX ORDER — PIPERACILLIN AND TAZOBACTAM 4; .5 G/20ML; G/20ML
3.38 INJECTION, POWDER, LYOPHILIZED, FOR SOLUTION INTRAVENOUS EVERY 8 HOURS
Qty: 0 | Refills: 0 | Status: DISCONTINUED | OUTPATIENT
Start: 2018-07-03 | End: 2018-07-06

## 2018-07-03 RX ADMIN — OXYCODONE HYDROCHLORIDE 10 MILLIGRAM(S): 5 TABLET ORAL at 10:30

## 2018-07-03 RX ADMIN — PIPERACILLIN AND TAZOBACTAM 25 GRAM(S): 4; .5 INJECTION, POWDER, LYOPHILIZED, FOR SOLUTION INTRAVENOUS at 21:48

## 2018-07-03 RX ADMIN — OXYCODONE HYDROCHLORIDE 10 MILLIGRAM(S): 5 TABLET ORAL at 01:45

## 2018-07-03 RX ADMIN — OXYCODONE HYDROCHLORIDE 10 MILLIGRAM(S): 5 TABLET ORAL at 01:08

## 2018-07-03 RX ADMIN — OXYCODONE HYDROCHLORIDE 10 MILLIGRAM(S): 5 TABLET ORAL at 17:37

## 2018-07-03 RX ADMIN — SODIUM CHLORIDE 100 MILLILITER(S): 9 INJECTION, SOLUTION INTRAVENOUS at 22:32

## 2018-07-03 RX ADMIN — OXYCODONE HYDROCHLORIDE 10 MILLIGRAM(S): 5 TABLET ORAL at 10:00

## 2018-07-03 NOTE — BRIEF OPERATIVE NOTE - PROCEDURE
<<-----Click on this checkbox to enter Procedure Blebectomy, thoracoscopic, with pleurodesis  07/03/2018    Active  [id number]

## 2018-07-03 NOTE — BRIEF OPERATIVE NOTE - OPERATION/FINDINGS
LEFT VATS with upper lobe blebectomy and mechanical pleurodesis. Thin, brown-tinged pleural effusion encountered. [place] of left upper lobe adherent to visceral pleural and pulsatile mass.

## 2018-07-04 LAB
ANION GAP SERPL CALC-SCNC: 12 MMOL/L — SIGNIFICANT CHANGE UP (ref 5–17)
BUN SERPL-MCNC: 16 MG/DL — SIGNIFICANT CHANGE UP (ref 7–23)
CALCIUM SERPL-MCNC: 8.4 MG/DL — SIGNIFICANT CHANGE UP (ref 8.4–10.5)
CHLORIDE SERPL-SCNC: 102 MMOL/L — SIGNIFICANT CHANGE UP (ref 96–108)
CO2 SERPL-SCNC: 23 MMOL/L — SIGNIFICANT CHANGE UP (ref 22–31)
CREAT SERPL-MCNC: 1.15 MG/DL — SIGNIFICANT CHANGE UP (ref 0.5–1.3)
GLUCOSE SERPL-MCNC: 112 MG/DL — HIGH (ref 70–99)
GRAM STN FLD: SIGNIFICANT CHANGE UP
GRAM STN FLD: SIGNIFICANT CHANGE UP
HCT VFR BLD CALC: 37.9 % — LOW (ref 39–50)
HGB BLD-MCNC: 13.1 G/DL — SIGNIFICANT CHANGE UP (ref 13–17)
MCHC RBC-ENTMCNC: 31.6 PG — SIGNIFICANT CHANGE UP (ref 27–34)
MCHC RBC-ENTMCNC: 34.6 GM/DL — SIGNIFICANT CHANGE UP (ref 32–36)
MCV RBC AUTO: 91.5 FL — SIGNIFICANT CHANGE UP (ref 80–100)
NIGHT BLUE STAIN TISS: SIGNIFICANT CHANGE UP
NIGHT BLUE STAIN TISS: SIGNIFICANT CHANGE UP
PLATELET # BLD AUTO: 299 K/UL — SIGNIFICANT CHANGE UP (ref 150–400)
POTASSIUM SERPL-MCNC: 4.2 MMOL/L — SIGNIFICANT CHANGE UP (ref 3.5–5.3)
POTASSIUM SERPL-SCNC: 4.2 MMOL/L — SIGNIFICANT CHANGE UP (ref 3.5–5.3)
RBC # BLD: 4.14 M/UL — LOW (ref 4.2–5.8)
RBC # FLD: 10.8 % — SIGNIFICANT CHANGE UP (ref 10.3–14.5)
SODIUM SERPL-SCNC: 137 MMOL/L — SIGNIFICANT CHANGE UP (ref 135–145)
SPECIMEN SOURCE: SIGNIFICANT CHANGE UP
WBC # BLD: 7.4 K/UL — SIGNIFICANT CHANGE UP (ref 3.8–10.5)
WBC # FLD AUTO: 7.4 K/UL — SIGNIFICANT CHANGE UP (ref 3.8–10.5)

## 2018-07-04 PROCEDURE — 71045 X-RAY EXAM CHEST 1 VIEW: CPT | Mod: 26

## 2018-07-04 PROCEDURE — 71275 CT ANGIOGRAPHY CHEST: CPT | Mod: 26

## 2018-07-04 RX ORDER — HYDROMORPHONE HYDROCHLORIDE 2 MG/ML
2 INJECTION INTRAMUSCULAR; INTRAVENOUS; SUBCUTANEOUS ONCE
Qty: 0 | Refills: 0 | Status: DISCONTINUED | OUTPATIENT
Start: 2018-07-04 | End: 2018-07-04

## 2018-07-04 RX ORDER — SENNA PLUS 8.6 MG/1
2 TABLET ORAL AT BEDTIME
Qty: 0 | Refills: 0 | Status: DISCONTINUED | OUTPATIENT
Start: 2018-07-04 | End: 2018-07-07

## 2018-07-04 RX ORDER — HYDROMORPHONE HYDROCHLORIDE 2 MG/ML
2 INJECTION INTRAMUSCULAR; INTRAVENOUS; SUBCUTANEOUS EVERY 4 HOURS
Qty: 0 | Refills: 0 | Status: DISCONTINUED | OUTPATIENT
Start: 2018-07-04 | End: 2018-07-07

## 2018-07-04 RX ORDER — HYDROMORPHONE HYDROCHLORIDE 2 MG/ML
1 INJECTION INTRAMUSCULAR; INTRAVENOUS; SUBCUTANEOUS EVERY 4 HOURS
Qty: 0 | Refills: 0 | Status: DISCONTINUED | OUTPATIENT
Start: 2018-07-04 | End: 2018-07-07

## 2018-07-04 RX ORDER — HYDROMORPHONE HYDROCHLORIDE 2 MG/ML
4 INJECTION INTRAMUSCULAR; INTRAVENOUS; SUBCUTANEOUS EVERY 4 HOURS
Qty: 0 | Refills: 0 | Status: DISCONTINUED | OUTPATIENT
Start: 2018-07-04 | End: 2018-07-07

## 2018-07-04 RX ORDER — POLYETHYLENE GLYCOL 3350 17 G/17G
17 POWDER, FOR SOLUTION ORAL DAILY
Qty: 0 | Refills: 0 | Status: DISCONTINUED | OUTPATIENT
Start: 2018-07-04 | End: 2018-07-07

## 2018-07-04 RX ADMIN — OXYCODONE HYDROCHLORIDE 10 MILLIGRAM(S): 5 TABLET ORAL at 08:00

## 2018-07-04 RX ADMIN — ENOXAPARIN SODIUM 40 MILLIGRAM(S): 100 INJECTION SUBCUTANEOUS at 12:56

## 2018-07-04 RX ADMIN — PIPERACILLIN AND TAZOBACTAM 25 GRAM(S): 4; .5 INJECTION, POWDER, LYOPHILIZED, FOR SOLUTION INTRAVENOUS at 21:30

## 2018-07-04 RX ADMIN — OXYCODONE HYDROCHLORIDE 10 MILLIGRAM(S): 5 TABLET ORAL at 01:48

## 2018-07-04 RX ADMIN — HYDROMORPHONE HYDROCHLORIDE 2 MILLIGRAM(S): 2 INJECTION INTRAMUSCULAR; INTRAVENOUS; SUBCUTANEOUS at 12:56

## 2018-07-04 RX ADMIN — HYDROMORPHONE HYDROCHLORIDE 1 MILLIGRAM(S): 2 INJECTION INTRAMUSCULAR; INTRAVENOUS; SUBCUTANEOUS at 10:00

## 2018-07-04 RX ADMIN — HYDROMORPHONE HYDROCHLORIDE 4 MILLIGRAM(S): 2 INJECTION INTRAMUSCULAR; INTRAVENOUS; SUBCUTANEOUS at 21:30

## 2018-07-04 RX ADMIN — HYDROMORPHONE HYDROCHLORIDE 2 MILLIGRAM(S): 2 INJECTION INTRAMUSCULAR; INTRAVENOUS; SUBCUTANEOUS at 13:41

## 2018-07-04 RX ADMIN — HYDROMORPHONE HYDROCHLORIDE 1 MILLIGRAM(S): 2 INJECTION INTRAMUSCULAR; INTRAVENOUS; SUBCUTANEOUS at 11:00

## 2018-07-04 RX ADMIN — HYDROMORPHONE HYDROCHLORIDE 4 MILLIGRAM(S): 2 INJECTION INTRAMUSCULAR; INTRAVENOUS; SUBCUTANEOUS at 17:00

## 2018-07-04 RX ADMIN — HYDROMORPHONE HYDROCHLORIDE 2 MILLIGRAM(S): 2 INJECTION INTRAMUSCULAR; INTRAVENOUS; SUBCUTANEOUS at 18:29

## 2018-07-04 RX ADMIN — OXYCODONE HYDROCHLORIDE 10 MILLIGRAM(S): 5 TABLET ORAL at 07:49

## 2018-07-04 RX ADMIN — POLYETHYLENE GLYCOL 3350 17 GRAM(S): 17 POWDER, FOR SOLUTION ORAL at 12:58

## 2018-07-04 RX ADMIN — HYDROMORPHONE HYDROCHLORIDE 2 MILLIGRAM(S): 2 INJECTION INTRAMUSCULAR; INTRAVENOUS; SUBCUTANEOUS at 23:07

## 2018-07-04 RX ADMIN — HYDROMORPHONE HYDROCHLORIDE 2 MILLIGRAM(S): 2 INJECTION INTRAMUSCULAR; INTRAVENOUS; SUBCUTANEOUS at 05:00

## 2018-07-04 RX ADMIN — PIPERACILLIN AND TAZOBACTAM 25 GRAM(S): 4; .5 INJECTION, POWDER, LYOPHILIZED, FOR SOLUTION INTRAVENOUS at 05:40

## 2018-07-04 RX ADMIN — SENNA PLUS 2 TABLET(S): 8.6 TABLET ORAL at 20:44

## 2018-07-04 RX ADMIN — OXYCODONE HYDROCHLORIDE 10 MILLIGRAM(S): 5 TABLET ORAL at 02:15

## 2018-07-04 RX ADMIN — HYDROMORPHONE HYDROCHLORIDE 4 MILLIGRAM(S): 2 INJECTION INTRAMUSCULAR; INTRAVENOUS; SUBCUTANEOUS at 20:44

## 2018-07-04 RX ADMIN — PIPERACILLIN AND TAZOBACTAM 25 GRAM(S): 4; .5 INJECTION, POWDER, LYOPHILIZED, FOR SOLUTION INTRAVENOUS at 13:41

## 2018-07-04 RX ADMIN — HYDROMORPHONE HYDROCHLORIDE 4 MILLIGRAM(S): 2 INJECTION INTRAMUSCULAR; INTRAVENOUS; SUBCUTANEOUS at 16:34

## 2018-07-04 RX ADMIN — HYDROMORPHONE HYDROCHLORIDE 2 MILLIGRAM(S): 2 INJECTION INTRAMUSCULAR; INTRAVENOUS; SUBCUTANEOUS at 04:36

## 2018-07-04 NOTE — PROGRESS NOTE ADULT - SUBJECTIVE AND OBJECTIVE BOX
Seen and examined. Resting comfortable. No acute events overnight  VITAL SIGNS    Telemetry:  sr  Vital Signs Last 24 Hrs  T(C): 37.4 (07-04-18 @ 05:19), Max: 37.4 (07-04-18 @ 05:19)  T(F): 99.3 (07-04-18 @ 05:19), Max: 99.3 (07-04-18 @ 05:19)  HR: 79 (07-04-18 @ 10:02) (70 - 97)  BP: 136/69 (07-04-18 @ 10:02) (108/58 - 136/69)  RR: 16 (07-04-18 @ 05:19) (16 - 18)  SpO2: 98% (07-04-18 @ 05:19) (93% - 100%)            07-03 @ 07:01  -  07-04 @ 07:00  --------------------------------------------------------  IN: 875 mL / OUT: 1270 mL / NET: -395 mL    07-04 @ 07:01  -  07-04 @ 10:44  --------------------------------------------------------  IN: 240 mL / OUT: 0 mL / NET: 240 mL       Daily Height in cm: 172.72 (03 Jul 2018 11:18)    Daily   Admit Wt: Drug Dosing Weight  Height (cm): 172.72 (03 Jul 2018 11:18)  Weight (kg): 67.4 (03 Jul 2018 11:18)  BMI (kg/m2): 22.6 (03 Jul 2018 11:18)  BSA (m2): 1.8 (03 Jul 2018 11:18)      Chest XRay:          MEDICATIONS  < from: Xray Chest 1 View- PORTABLE-Routine (07.04.18 @ 05:31) >    Impression: Left chest tube. Trace left pneumothorax at the apex,   unchanged. Left chest wall subcutaneous emphysema. Heart unremarkable. No   lung consolidations.    < end of copied text >  acetaminophen   Tablet. 650 milliGRAM(s) Oral every 6 hours PRN  enoxaparin Injectable 40 milliGRAM(s) SubCutaneous daily  HYDROmorphone   Tablet 2 milliGRAM(s) Oral every 4 hours PRN  HYDROmorphone   Tablet 4 milliGRAM(s) Oral every 4 hours PRN  HYDROmorphone  Injectable 1 milliGRAM(s) IV Push every 4 hours PRN  piperacillin/tazobactam IVPB. 3.375 Gram(s) IV Intermittent every 8 hours  polyethylene glycol 3350 17 Gram(s) Oral daily  senna 2 Tablet(s) Oral at bedtime      PHYSICAL EXAM    Subjective:  c/p 5/10 severe  left axilla pain   Neurology: alert and oriented x 3, nonfocal, no gross deficits  CV : s1s1 reg no MRGS  Sternal Wound :  CDI , Stable  Lungs: poor inspiratory efffort, decreased at bases left ct to clws no airleak no crepitus output 710ml/24h   Skin/Wound; Left axilary VATS site CDI    Abdomen: soft, nontender, nondistended, positive bowel sounds, last bowel movement  7/3   :    voids  Extremities:  no edema,   b/l groins no hematoma, distal pulses  b/l , no calf tenderness b/l , warm and perfused b/l    LABS  07-04    137  |  102  |  16  ----------------------------<  112<H>  4.2   |  23  |  1.15    Ca    8.4      04 Jul 2018 05:34  Phos  3.3     07-02                                   13.1   7.4   )-----------( 299      ( 04 Jul 2018 05:34 )             37.9                   PAST MEDICAL & SURGICAL HISTORY:  Pneumothorax  No significant past surgical history       Physical Therapy Rec:   Home  [x ]   Home w/ PT  [  ]  Rehab  [  ]

## 2018-07-05 DIAGNOSIS — B99.9 UNSPECIFIED INFECTIOUS DISEASE: ICD-10-CM

## 2018-07-05 PROBLEM — Z00.00 ENCOUNTER FOR PREVENTIVE HEALTH EXAMINATION: Status: ACTIVE | Noted: 2018-07-05

## 2018-07-05 PROBLEM — J93.9 PNEUMOTHORAX, UNSPECIFIED: Chronic | Status: ACTIVE | Noted: 2018-06-29

## 2018-07-05 LAB
-  AMIKACIN: SIGNIFICANT CHANGE UP
-  AMIKACIN: SIGNIFICANT CHANGE UP
-  AZTREONAM: SIGNIFICANT CHANGE UP
-  AZTREONAM: SIGNIFICANT CHANGE UP
-  CEFEPIME: SIGNIFICANT CHANGE UP
-  CEFEPIME: SIGNIFICANT CHANGE UP
-  CEFTAZIDIME: SIGNIFICANT CHANGE UP
-  CEFTAZIDIME: SIGNIFICANT CHANGE UP
-  CIPROFLOXACIN: SIGNIFICANT CHANGE UP
-  CIPROFLOXACIN: SIGNIFICANT CHANGE UP
-  GENTAMICIN: SIGNIFICANT CHANGE UP
-  GENTAMICIN: SIGNIFICANT CHANGE UP
-  IMIPENEM: SIGNIFICANT CHANGE UP
-  IMIPENEM: SIGNIFICANT CHANGE UP
-  LEVOFLOXACIN: SIGNIFICANT CHANGE UP
-  LEVOFLOXACIN: SIGNIFICANT CHANGE UP
-  MEROPENEM: SIGNIFICANT CHANGE UP
-  MEROPENEM: SIGNIFICANT CHANGE UP
-  PIPERACILLIN/TAZOBACTAM: SIGNIFICANT CHANGE UP
-  PIPERACILLIN/TAZOBACTAM: SIGNIFICANT CHANGE UP
-  TOBRAMYCIN: SIGNIFICANT CHANGE UP
-  TOBRAMYCIN: SIGNIFICANT CHANGE UP
ANION GAP SERPL CALC-SCNC: 13 MMOL/L — SIGNIFICANT CHANGE UP (ref 5–17)
BUN SERPL-MCNC: 12 MG/DL — SIGNIFICANT CHANGE UP (ref 7–23)
CALCIUM SERPL-MCNC: 8.6 MG/DL — SIGNIFICANT CHANGE UP (ref 8.4–10.5)
CHLORIDE SERPL-SCNC: 99 MMOL/L — SIGNIFICANT CHANGE UP (ref 96–108)
CO2 SERPL-SCNC: 25 MMOL/L — SIGNIFICANT CHANGE UP (ref 22–31)
CREAT SERPL-MCNC: 1.13 MG/DL — SIGNIFICANT CHANGE UP (ref 0.5–1.3)
GLUCOSE SERPL-MCNC: 101 MG/DL — HIGH (ref 70–99)
HCT VFR BLD CALC: 35.5 % — LOW (ref 39–50)
HGB BLD-MCNC: 12.9 G/DL — LOW (ref 13–17)
MCHC RBC-ENTMCNC: 33.7 PG — SIGNIFICANT CHANGE UP (ref 27–34)
MCHC RBC-ENTMCNC: 36.4 GM/DL — HIGH (ref 32–36)
MCV RBC AUTO: 92.5 FL — SIGNIFICANT CHANGE UP (ref 80–100)
METHOD TYPE: SIGNIFICANT CHANGE UP
METHOD TYPE: SIGNIFICANT CHANGE UP
PLATELET # BLD AUTO: 300 K/UL — SIGNIFICANT CHANGE UP (ref 150–400)
POTASSIUM SERPL-MCNC: 4.1 MMOL/L — SIGNIFICANT CHANGE UP (ref 3.5–5.3)
POTASSIUM SERPL-SCNC: 4.1 MMOL/L — SIGNIFICANT CHANGE UP (ref 3.5–5.3)
RBC # BLD: 3.84 M/UL — LOW (ref 4.2–5.8)
RBC # FLD: 10.7 % — SIGNIFICANT CHANGE UP (ref 10.3–14.5)
SODIUM SERPL-SCNC: 137 MMOL/L — SIGNIFICANT CHANGE UP (ref 135–145)
WBC # BLD: 7.8 K/UL — SIGNIFICANT CHANGE UP (ref 3.8–10.5)
WBC # FLD AUTO: 7.8 K/UL — SIGNIFICANT CHANGE UP (ref 3.8–10.5)

## 2018-07-05 PROCEDURE — 99231 SBSQ HOSP IP/OBS SF/LOW 25: CPT

## 2018-07-05 PROCEDURE — 71045 X-RAY EXAM CHEST 1 VIEW: CPT | Mod: 26

## 2018-07-05 PROCEDURE — 99222 1ST HOSP IP/OBS MODERATE 55: CPT

## 2018-07-05 RX ADMIN — POLYETHYLENE GLYCOL 3350 17 GRAM(S): 17 POWDER, FOR SOLUTION ORAL at 13:37

## 2018-07-05 RX ADMIN — HYDROMORPHONE HYDROCHLORIDE 1 MILLIGRAM(S): 2 INJECTION INTRAMUSCULAR; INTRAVENOUS; SUBCUTANEOUS at 13:36

## 2018-07-05 RX ADMIN — SENNA PLUS 2 TABLET(S): 8.6 TABLET ORAL at 22:47

## 2018-07-05 RX ADMIN — HYDROMORPHONE HYDROCHLORIDE 1 MILLIGRAM(S): 2 INJECTION INTRAMUSCULAR; INTRAVENOUS; SUBCUTANEOUS at 13:51

## 2018-07-05 RX ADMIN — PIPERACILLIN AND TAZOBACTAM 25 GRAM(S): 4; .5 INJECTION, POWDER, LYOPHILIZED, FOR SOLUTION INTRAVENOUS at 22:47

## 2018-07-05 RX ADMIN — HYDROMORPHONE HYDROCHLORIDE 4 MILLIGRAM(S): 2 INJECTION INTRAMUSCULAR; INTRAVENOUS; SUBCUTANEOUS at 12:50

## 2018-07-05 RX ADMIN — ENOXAPARIN SODIUM 40 MILLIGRAM(S): 100 INJECTION SUBCUTANEOUS at 13:36

## 2018-07-05 RX ADMIN — HYDROMORPHONE HYDROCHLORIDE 4 MILLIGRAM(S): 2 INJECTION INTRAMUSCULAR; INTRAVENOUS; SUBCUTANEOUS at 05:52

## 2018-07-05 RX ADMIN — PIPERACILLIN AND TAZOBACTAM 25 GRAM(S): 4; .5 INJECTION, POWDER, LYOPHILIZED, FOR SOLUTION INTRAVENOUS at 13:36

## 2018-07-05 RX ADMIN — HYDROMORPHONE HYDROCHLORIDE 4 MILLIGRAM(S): 2 INJECTION INTRAMUSCULAR; INTRAVENOUS; SUBCUTANEOUS at 12:20

## 2018-07-05 RX ADMIN — HYDROMORPHONE HYDROCHLORIDE 4 MILLIGRAM(S): 2 INJECTION INTRAMUSCULAR; INTRAVENOUS; SUBCUTANEOUS at 05:20

## 2018-07-05 RX ADMIN — HYDROMORPHONE HYDROCHLORIDE 4 MILLIGRAM(S): 2 INJECTION INTRAMUSCULAR; INTRAVENOUS; SUBCUTANEOUS at 18:40

## 2018-07-05 RX ADMIN — PIPERACILLIN AND TAZOBACTAM 25 GRAM(S): 4; .5 INJECTION, POWDER, LYOPHILIZED, FOR SOLUTION INTRAVENOUS at 05:08

## 2018-07-05 RX ADMIN — HYDROMORPHONE HYDROCHLORIDE 2 MILLIGRAM(S): 2 INJECTION INTRAMUSCULAR; INTRAVENOUS; SUBCUTANEOUS at 00:08

## 2018-07-05 RX ADMIN — HYDROMORPHONE HYDROCHLORIDE 4 MILLIGRAM(S): 2 INJECTION INTRAMUSCULAR; INTRAVENOUS; SUBCUTANEOUS at 22:47

## 2018-07-05 RX ADMIN — HYDROMORPHONE HYDROCHLORIDE 4 MILLIGRAM(S): 2 INJECTION INTRAMUSCULAR; INTRAVENOUS; SUBCUTANEOUS at 23:30

## 2018-07-05 RX ADMIN — HYDROMORPHONE HYDROCHLORIDE 2 MILLIGRAM(S): 2 INJECTION INTRAMUSCULAR; INTRAVENOUS; SUBCUTANEOUS at 11:16

## 2018-07-05 RX ADMIN — HYDROMORPHONE HYDROCHLORIDE 2 MILLIGRAM(S): 2 INJECTION INTRAMUSCULAR; INTRAVENOUS; SUBCUTANEOUS at 10:46

## 2018-07-05 NOTE — CONSULT NOTE ADULT - SUBJECTIVE AND OBJECTIVE BOX
Patient is a 22y old  Male who presents with a chief complaint of Chest pain (29 Jun 2018 16:10)    From HPI" HPI:  22F only PMH spontaneous pneumothorax in 2014 in [place] treated at the time with Heimlich valve who presents with left chest and back pain since yesterday. It started suddenly, is sharp and worsens with deep breaths. He denies trauma. He denies fever/chills. CXR in ED found moderate L pneumothorax. Previous workup did not reveal an obvious cause. He denies family hx. He does not smoke.  ED placed a pigtail with small residual apical pneumothorax. (29 Jun 2018 16:10)  "    Above verified-agree with above unless noted below.  Post admission patient underwent    Operative Findings:  · Operative Findings	LEFT VATS with upper lobe blebectomy and mechanical pleurodesis. Thin, brown-tinged pleural effusion encountered. [place] of left upper lobe adherent to visceral pleural and pulsatile mass.	    Cultures were sent and are now growing pseudomonas     ID consulted     PAST MEDICAL & SURGICAL HISTORY:  Pneumothorax  No significant past surgical history      Social history:  Occ    Smoking,  occ   ETOH, denies     IVDU     says PPD neg in past  HIV unknown willing for testing     FAMILY HISTORY:  - Reviewed,Non contributory     REVIEW OF SYSTEMS  General:	Denies any malaise fatigue or chills. Fevers absent    Skin:No rash  	  Ophthalmologic:Denies any visual complaints,discharge redness or photophobia  	  ENMT:No nasal discharge,headache,sinus congestion or throat pain.No dental complaints    Respiratory and Thorax:No cough,sputum + chest tube site -pain,   Denies shortness of breath  	  Cardiovascular:	No chest pain,palpitaions or dizziness    Gastrointestinal:	NO nausea,abdominal pain or diarrhea.    Genitourinary:	No dysuria,frequency. No flank pain    Musculoskeletal:	No joint swelling or pain.No weakness    Neurological:No confusion,diziness.No extremity weakness.No bladder or bowel incontinence	    Psychiatric:No delusions or hallucinations	    Hematology/Lymphatics:	No LN swelling.No gum bleeding     Endocrine:	No recent weight gain or loss.No abnormal heat/cold intolerance    Allergic/Immunologic:	No hives or rash   Allergies    No Known Allergies    Intolerances        Antimicrobials:    piperacillin/tazobactam IVPB. 3.375 Gram(s) IV Intermittent every 8 hours    Other medications reviewed      Vital Signs Last 24 Hrs  T(C): 37 (05 Jul 2018 12:16), Max: 37.4 (05 Jul 2018 00:12)  T(F): 98.6 (05 Jul 2018 12:16), Max: 99.3 (05 Jul 2018 00:12)  HR: 89 (05 Jul 2018 12:16) (61 - 99)  BP: 104/66 (05 Jul 2018 12:16) (104/66 - 125/72)  BP(mean): --  RR: 16 (05 Jul 2018 12:16) (16 - 18)  SpO2: 95% (05 Jul 2018 12:16) (93% - 95%)    PHYSICAL EXAM:Pleasant patient in no acute distress.      Constitutional:Comfortable.Awake and alert  No cachexia     Eyes:PERRL EOMI.NO discharge or conjunctival injection    ENMT:No sinus tenderness.No thrush.No pharyngeal exudate or erythema.Fair dental hygiene    Neck:Supple,No LN,no JVD      Respiratory:Good air entry bilaterally,left chest tube occ crackles     Cardiovascular:S1 S2 wnl, No murmurs,rub or gallops    Gastrointestinal:Soft BS(+) no tenderness no masses ,No rebound or guarding    Genitourinary:No CVA tendereness       Extremities:No cyanosis,clubbing or edema.    Vascular:peripheral pulses felt    Neurological:AAO X 3,No grossly focal deficits    Skin:No rash     Lymph Nodes:No palpable LNs    Musculoskeletal:No joint swelling or LOM    Psychiatric:Affect normal.          Labs:                            12.9   7.8   )-----------( 300      ( 05 Jul 2018 05:12 )             35.5   WBC Count: 7.8 (07-05-18 @ 05:12)  WBC Count: 7.4 (07-04-18 @ 05:34)  WBC Count: 6.1 (07-03-18 @ 05:51)  WBC Count: 8.0 (07-02-18 @ 05:54)  WBC Count: 9.0 (07-01-18 @ 06:20)  WBC Count: 10.60 (06-30-18 @ 08:39)  WBC Count: 11.4 (06-29-18 @ 10:56)        07-05    137  |  99  |  12  ----------------------------<  101<H>  4.1   |  25  |  1.13    Ca    8.6      05 Jul 2018 05:12              Culture - Tissue with Gram Stain (collected 04 Jul 2018 00:29)  Source: .Tissue Other, upper left lobe wedge  Gram Stain (04 Jul 2018 06:52):    Rare polymorphonuclear leukocytes seen per low power field    No organisms seen  Preliminary Report (04 Jul 2018 21:19):    Rare Pseudomonas aeruginosa    Culture - Fungal, Tissue (collected 04 Jul 2018 00:29)  Source: .Tissue upper left lobe wedge  Preliminary Report (05 Jul 2018 08:34):    Testing in progress    Culture - Acid Fast - Tissue w/Smear (collected 04 Jul 2018 00:29)  Source: .Tissue Other, upper left lobe wedge    Culture - Tissue with Gram Stain (collected 04 Jul 2018 00:21)  Source: .Tissue Other, pleural biopsy  Gram Stain (04 Jul 2018 06:52):    Rare polymorphonuclear leukocytes seen per low power field    No organisms seen  Preliminary Report (04 Jul 2018 21:14):    Rare Pseudomonas aeruginosa    Culture - Fungal, Tissue (collected 04 Jul 2018 00:21)  Source: .Tissue pleural biopsy  Preliminary Report (05 Jul 2018 08:39):    Testing in progress    Culture - Acid Fast - Tissue w/Smear (collected 04 Jul 2018 00:21)  Source: .Tissue Other, pleural biopsy      < from: Xray Chest 1 View- PORTABLE-Routine (07.05.18 @ 08:48) >  IMPRESSION:  Left basilar opacity likely represents focal atelectasis.  Postoperative changes of the left lung.  The right lung is clear.      < end of copied text >      < from: CT Angio Chest w/ IV Cont (07.04.18 @ 01:05) >  IMPRESSION: No thoracic aortic aneurysm. No aortic dissection. No   evidence for central pulmonary embolus. Left chest tube in place with   small left hydropneumothorax.        < end of copied text >

## 2018-07-05 NOTE — CONSULT NOTE ADULT - ASSESSMENT
22F only PMH spontaneous pneumothorax in 2014 in [place] treated at the time with Heimlich valve who presents with left chest and back pain since yesterday.  Pnenumothorax  s/p bleb resection and VATS  Murky fl;uid noted intra op  cultures growing Pseudomonas  Patient with no SIRS/sepsis and prior to presentation no clinical s/s active bacterial infection  No h/o lung diaese in family  No sick contacts  Thus his presentation is very atypical  Would rec:  1) Follow final ID sensi  2) Blood Cx  3) Check HIV,quant,SUDHIR  4) Continue empiric zosyn   5) Will tailor plan for ID issues  per course,results.Will defer to primary team on management of other issues.  Consider work up for Marfan,homocystinuria and other risk factors for PSP(though no family history).Advised smoking cessation  case d/w Dr Valente and thoracic team  Will Follow.  Beeper [id number]-zaos/afterhours/No [id number]

## 2018-07-05 NOTE — PROGRESS NOTE ADULT - SUBJECTIVE AND OBJECTIVE BOX
Seen and examined. Resting comfortable. No acute events overnight. Denies short of breath.   VITAL SIGNS      Vital Signs Last 24 Hrs  T(C): 37 (18 @ 12:16), Max: 37.4 (18 @ 00:12)  T(F): 98.6 (18 @ 12:16), Max: 99.3 (18 @ 00:12)  HR: 89 (18 @ 12:16) (61 - 99)  BP: 104/66 (18 @ 12:16) (104/66 - 125/72)  RR: 16 (18 @ 12:16) (16 - 18)  SpO2: 95% (18 @ 12:16) (93% - 95%)            04 @ 07:01  -  05 @ 07:00  --------------------------------------------------------  IN: 1280 mL / OUT: 1010 mL / NET: 270 mL     @ 07:01  -   @ 15:39  --------------------------------------------------------  IN: 840 mL / OUT: 600 mL / NET: 240 mL       Daily     Daily Weight in k.5 (2018 08:34)  Admit Wt: Drug Dosing Weight  Height (cm): 172.72 (2018 11:18)  Weight (kg): 67.4 (2018 11:18)  BMI (kg/m2): 22.6 (2018 11:18)  BSA (m2): 1.8 (2018 11:18)      CAPILLARY BLOOD GLUCOSE              MEDICATIONS  acetaminophen   Tablet. 650 milliGRAM(s) Oral every 6 hours PRN  enoxaparin Injectable 40 milliGRAM(s) SubCutaneous daily  HYDROmorphone   Tablet 2 milliGRAM(s) Oral every 4 hours PRN  HYDROmorphone   Tablet 4 milliGRAM(s) Oral every 4 hours PRN  HYDROmorphone  Injectable 1 milliGRAM(s) IV Push every 4 hours PRN  piperacillin/tazobactam IVPB. 3.375 Gram(s) IV Intermittent every 8 hours  polyethylene glycol 3350 17 Gram(s) Oral daily  senna 2 Tablet(s) Oral at bedtime      PHYSICAL EXAM    Subjective:  c/o left armpit blisters  Neurology: alert and oriented x 3, nonfocal, no gross deficits  CV : s1s1 reg no MRGS  Lungs: CTA, equal chest expansion  Wouns Left axillary CDI + blisters   Drains: left  chest tube to clws no crepitus no airleak output 110ml/serous fluid   Abdomen: soft, nontender, nondistended, positive bowel sounds, last bowel movement   :    voids  Extremities:    no edema,  +distal pulses present  b/l , no calf tenderness b/l , warm and perfused b/l    CXR:    LABS      137  |  99  |  12  ----------------------------<  101<H>  4.1   |  25  |  1.13    Ca    8.6      2018 05:12                                   12.9   7.8   )-----------( 300      ( 2018 05:12 )             35.5                   PAST MEDICAL & SURGICAL HISTORY:  Pneumothorax  No significant past surgical history        Outpatient follow up: Dr Valente    Culture Results:  left lower wedge   Rare Pseudomonas aeruginosa (18 @ 00:29)    CXR  < from: Xray Chest 1 View- PORTABLE-Routine (18 @ 05:31) >  Impression: Left chest tube. Trace left pneumothorax at the apex,   unchanged. Left chest wall subcutaneous emphysema. Heart unremarkable. No   lung consolidations.    < end of copied text >

## 2018-07-06 LAB
ANION GAP SERPL CALC-SCNC: 10 MMOL/L — SIGNIFICANT CHANGE UP (ref 5–17)
BUN SERPL-MCNC: 14 MG/DL — SIGNIFICANT CHANGE UP (ref 7–23)
CALCIUM SERPL-MCNC: 8.7 MG/DL — SIGNIFICANT CHANGE UP (ref 8.4–10.5)
CHLORIDE SERPL-SCNC: 102 MMOL/L — SIGNIFICANT CHANGE UP (ref 96–108)
CO2 SERPL-SCNC: 25 MMOL/L — SIGNIFICANT CHANGE UP (ref 22–31)
CREAT SERPL-MCNC: 1.01 MG/DL — SIGNIFICANT CHANGE UP (ref 0.5–1.3)
GLUCOSE SERPL-MCNC: 89 MG/DL — SIGNIFICANT CHANGE UP (ref 70–99)
HCT VFR BLD CALC: 36.7 % — LOW (ref 39–50)
HGB BLD-MCNC: 12.5 G/DL — LOW (ref 13–17)
HIV 1+2 AB+HIV1 P24 AG SERPL QL IA: SIGNIFICANT CHANGE UP
MCHC RBC-ENTMCNC: 31.8 PG — SIGNIFICANT CHANGE UP (ref 27–34)
MCHC RBC-ENTMCNC: 34.2 GM/DL — SIGNIFICANT CHANGE UP (ref 32–36)
MCV RBC AUTO: 92.9 FL — SIGNIFICANT CHANGE UP (ref 80–100)
PLATELET # BLD AUTO: 336 K/UL — SIGNIFICANT CHANGE UP (ref 150–400)
POTASSIUM SERPL-MCNC: 4.4 MMOL/L — SIGNIFICANT CHANGE UP (ref 3.5–5.3)
POTASSIUM SERPL-SCNC: 4.4 MMOL/L — SIGNIFICANT CHANGE UP (ref 3.5–5.3)
RBC # BLD: 3.95 M/UL — LOW (ref 4.2–5.8)
RBC # FLD: 10.8 % — SIGNIFICANT CHANGE UP (ref 10.3–14.5)
SODIUM SERPL-SCNC: 137 MMOL/L — SIGNIFICANT CHANGE UP (ref 135–145)
WBC # BLD: 7.6 K/UL — SIGNIFICANT CHANGE UP (ref 3.8–10.5)
WBC # FLD AUTO: 7.6 K/UL — SIGNIFICANT CHANGE UP (ref 3.8–10.5)

## 2018-07-06 PROCEDURE — 71045 X-RAY EXAM CHEST 1 VIEW: CPT | Mod: 26

## 2018-07-06 PROCEDURE — 71045 X-RAY EXAM CHEST 1 VIEW: CPT | Mod: 26,77

## 2018-07-06 PROCEDURE — 93306 TTE W/DOPPLER COMPLETE: CPT | Mod: 26

## 2018-07-06 PROCEDURE — 99233 SBSQ HOSP IP/OBS HIGH 50: CPT

## 2018-07-06 RX ORDER — HYDROMORPHONE HYDROCHLORIDE 2 MG/ML
0.5 INJECTION INTRAMUSCULAR; INTRAVENOUS; SUBCUTANEOUS ONCE
Qty: 0 | Refills: 0 | Status: DISCONTINUED | OUTPATIENT
Start: 2018-07-06 | End: 2018-07-06

## 2018-07-06 RX ADMIN — HYDROMORPHONE HYDROCHLORIDE 2 MILLIGRAM(S): 2 INJECTION INTRAMUSCULAR; INTRAVENOUS; SUBCUTANEOUS at 16:05

## 2018-07-06 RX ADMIN — ENOXAPARIN SODIUM 40 MILLIGRAM(S): 100 INJECTION SUBCUTANEOUS at 12:11

## 2018-07-06 RX ADMIN — HYDROMORPHONE HYDROCHLORIDE 4 MILLIGRAM(S): 2 INJECTION INTRAMUSCULAR; INTRAVENOUS; SUBCUTANEOUS at 11:20

## 2018-07-06 RX ADMIN — HYDROMORPHONE HYDROCHLORIDE 2 MILLIGRAM(S): 2 INJECTION INTRAMUSCULAR; INTRAVENOUS; SUBCUTANEOUS at 06:40

## 2018-07-06 RX ADMIN — Medication 1 APPLICATION(S): at 18:31

## 2018-07-06 RX ADMIN — POLYETHYLENE GLYCOL 3350 17 GRAM(S): 17 POWDER, FOR SOLUTION ORAL at 12:11

## 2018-07-06 RX ADMIN — HYDROMORPHONE HYDROCHLORIDE 4 MILLIGRAM(S): 2 INJECTION INTRAMUSCULAR; INTRAVENOUS; SUBCUTANEOUS at 10:37

## 2018-07-06 RX ADMIN — HYDROMORPHONE HYDROCHLORIDE 0.5 MILLIGRAM(S): 2 INJECTION INTRAMUSCULAR; INTRAVENOUS; SUBCUTANEOUS at 09:05

## 2018-07-06 RX ADMIN — HYDROMORPHONE HYDROCHLORIDE 2 MILLIGRAM(S): 2 INJECTION INTRAMUSCULAR; INTRAVENOUS; SUBCUTANEOUS at 15:29

## 2018-07-06 RX ADMIN — SENNA PLUS 2 TABLET(S): 8.6 TABLET ORAL at 21:46

## 2018-07-06 RX ADMIN — HYDROMORPHONE HYDROCHLORIDE 0.5 MILLIGRAM(S): 2 INJECTION INTRAMUSCULAR; INTRAVENOUS; SUBCUTANEOUS at 09:20

## 2018-07-06 RX ADMIN — PIPERACILLIN AND TAZOBACTAM 25 GRAM(S): 4; .5 INJECTION, POWDER, LYOPHILIZED, FOR SOLUTION INTRAVENOUS at 06:01

## 2018-07-06 NOTE — PROGRESS NOTE ADULT - PROBLEM SELECTOR PLAN 1
Maintain chest tube to lws  Will consider left VATS bleb resection/pleurodesis with Dr Valente  analgesics
Maintain chest tube to lws  NPO for left VATS bleb resection/pleurodesis with Dr Valente   analgesics
Maintain chest tube to lws  Will consider left VATS bleb resection/pleurodesis with Dr Valente Monday at [facility] vs Tuesday at [facility]  analgesics
Maintain chest tube to lwsx3 days  analgesics Dilaudid  DVT ppx  oxygen supplementation as needed wean off to RA   am CXR   monitor drainage
Maintain chest tube to lwsx3 days  analgesics dilaudid  DVT ppx  oxygen supplementation as needed wean off to RA   am CXR   monitor drainage
Maintain chest tube to lwsx3 days  pt to remain on suction   analgesics Dilaudid  DVT ppx  oxygen supplementation as needed wean off to RA   am CXR   monitor drainage

## 2018-07-06 NOTE — PROGRESS NOTE ADULT - PROBLEM SELECTOR PROBLEM 1
Recurrent spontaneous pneumothorax

## 2018-07-06 NOTE — PROGRESS NOTE ADULT - SUBJECTIVE AND OBJECTIVE BOX
Patient is a 22y old  Male who presents with a chief complaint of Chest pain (29 Jun 2018 16:10)    Being followed by ID for pseudomonas bleb infection ? empyema    Interval history:still with chest tube  Some pain   No other acute events      ROS:  No cough,SOB,CP  No N/V/D  No abd pain  No urinary complaints  No HA  No joint or limb pain  No other complaints      Antimicrobials:    levoFLOXacin IVPB      zosyn Dced     other medications reviewed    Vital Signs Last 24 Hrs  T(C): 37.2 (07-06-18 @ 06:39), Max: 37.9 (07-05-18 @ 20:12)  T(F): 98.9 (07-06-18 @ 06:39), Max: 100.3 (07-05-18 @ 20:12)  HR: 81 (07-06-18 @ 06:39) (64 - 92)  BP: 109/70 (07-06-18 @ 06:39) (104/66 - 131/71)  BP(mean): --  RR: 18 (07-06-18 @ 06:39) (16 - 18)  SpO2: 95% (07-06-18 @ 06:39) (95% - 96%)    Physical Exam:    Constitutional well preserved,comfortable,pleasant    HEENT PERRLA EOMI,No pallor or icterus    No oral exudate or erythema    Neck supple no JVD or LN    Chest Good AE,Left chest tube     CVS RRR S1 S2 WNl No murmur or rub or gallop    Abd soft BS normal No tenderness no masses    Ext No cyanosis clubbing or edema  No arachnodactyly  ? arm span > body length (rough measurement 70 inches V 5feet 7 inch height-however is liomited by his inability to fully extend arm due to c hest tube )    IV site no erythema tenderness or discharge    Joints no swelling or LOM    CNS AAO X 3 no focal    Lab Data:                          12.5   7.6   )-----------( 336      ( 06 Jul 2018 06:27 )             36.7       07-06    137  |  102  |  14  ----------------------------<  89  4.4   |  25  |  1.01    Ca    8.7      06 Jul 2018 06:27          Culture - Tissue with Gram Stain (collected 04 Jul 2018 00:29)  Source: .Tissue Other, upper left lobe wedge  Gram Stain (04 Jul 2018 06:52):    Rare polymorphonuclear leukocytes seen per low power field    No organisms seen  Preliminary Report (04 Jul 2018 21:19):    Rare Pseudomonas aeruginosa  Organism: Pseudomonas aeruginosa (05 Jul 2018 16:49)  Organism: Pseudomonas aeruginosa (05 Jul 2018 16:49)      -  Amikacin: S <=8      -  Aztreonam: S <=4      -  Cefepime: S <=2      -  Ceftazidime: S 4      -  Ciprofloxacin: S <=0.5      -  Gentamicin: I 8      -  Imipenem: S <=1      -  Levofloxacin: S <=1      -  Meropenem: S <=1      -  Piperacillin/Tazobactam: S <=8      -  Tobramycin: S <=2      Method Type: LISA    Culture - Fungal, Tissue (collected 04 Jul 2018 00:29)  Source: .Tissue upper left lobe wedge  Preliminary Report (05 Jul 2018 08:34):    Testing in progress    Culture - Acid Fast - Tissue w/Smear (collected 04 Jul 2018 00:29)  Source: .Tissue Other, upper left lobe wedge    Culture - Tissue with Gram Stain (collected 04 Jul 2018 00:21)  Source: .Tissue Other, pleural biopsy  Gram Stain (04 Jul 2018 06:52):    Rare polymorphonuclear leukocytes seen per low power field    No organisms seen  Preliminary Report (04 Jul 2018 21:14):    Rare Pseudomonas aeruginosa  Organism: Pseudomonas aeruginosa (05 Jul 2018 16:54)  Organism: Pseudomonas aeruginosa (05 Jul 2018 16:54)      -  Amikacin: S <=8      -  Aztreonam: S <=4      -  Cefepime: S 4      -  Ceftazidime: S 4      -  Ciprofloxacin: S <=0.5      -  Gentamicin: S 2      -  Imipenem: S <=1      -  Levofloxacin: S <=1      -  Meropenem: S <=1      -  Piperacillin/Tazobactam: S <=8      -  Tobramycin: S <=2      Method Type: LISA    Culture - Fungal, Tissue (collected 04 Jul 2018 00:21)  Source: .Tissue pleural biopsy  Preliminary Report (05 Jul 2018 08:39):    Testing in progress    Culture - Acid Fast - Tissue w/Smear (collected 04 Jul 2018 00:21)  Source: .Tissue Other, pleural biopsy

## 2018-07-06 NOTE — PROGRESS NOTE ADULT - SUBJECTIVE AND OBJECTIVE BOX
VITAL SIGNS    Vital Signs Last 24 Hrs  T(C): 37.2 (18 @ 06:39), Max: 37.9 (18 @ 20:12)  T(F): 98.9 (18 @ 06:39), Max: 100.3 (18 @ 20:12)  HR: 81 (18 @ 06:39) (64 - 99)  BP: 109/70 (18 @ 06:39) (104/66 - 131/71)  RR: 18 (18 @ 06:39) (16 - 18)  SpO2: 95% (18 @ 06:39) (95% - 96%)             @ 07:01  -   @ 07:00  --------------------------------------------------------  IN: 1460 mL / OUT: 960 mL / NET: 500 mL       Daily     Daily Weight in k.5 (2018 08:29)  Admit Wt: Drug Dosing Weight  Height (cm): 172.72 (2018 11:18)  Weight (kg): 67.4 (2018 11:18)  BMI (kg/m2): 22.6 (2018 11:18)  BSA (m2): 1.8 (2018 11:18)    MEDICATIONS  acetaminophen   Tablet. 650 milliGRAM(s) Oral every 6 hours PRN  enoxaparin Injectable 40 milliGRAM(s) SubCutaneous daily  HYDROmorphone   Tablet 2 milliGRAM(s) Oral every 4 hours PRN  HYDROmorphone   Tablet 4 milliGRAM(s) Oral every 4 hours PRN  HYDROmorphone  Injectable 1 milliGRAM(s) IV Push every 4 hours PRN  levoFLOXacin IVPB      levoFLOXacin IVPB 750 milliGRAM(s) IV Intermittent once  polyethylene glycol 3350 17 Gram(s) Oral daily  senna 2 Tablet(s) Oral at bedtime      PHYSICAL EXAM  Subjective: NAD  Neurology: alert and oriented x 3, nonfocal, no gross deficits  CV : S1S2  Lungs: left chest tube to lws -10cc  Abdomen: soft, NT,ND, (+ )BM  :  voiding  Extremities:-c/c/e       LABS  -    137  |  102  |  14  ----------------------------<  89  4.4   |  25  |  1.01    Ca    8.7      2018 06:27                                   12.5   7.6   )-----------( 336      ( 2018 06:27 )             36.7                 PAST MEDICAL & SURGICAL HISTORY:  Pneumothorax  No significant past surgical history

## 2018-07-07 ENCOUNTER — TRANSCRIPTION ENCOUNTER (OUTPATIENT)
Age: 23
End: 2018-07-07

## 2018-07-07 VITALS
RESPIRATION RATE: 18 BRPM | HEART RATE: 62 BPM | DIASTOLIC BLOOD PRESSURE: 69 MMHG | SYSTOLIC BLOOD PRESSURE: 112 MMHG | TEMPERATURE: 99 F | OXYGEN SATURATION: 96 %

## 2018-07-07 LAB
ANA TITR SER: NEGATIVE — SIGNIFICANT CHANGE UP
GAMMA INTERFERON BACKGROUND BLD IA-ACNC: 0.01 IU/ML — SIGNIFICANT CHANGE UP
M TB IFN-G BLD-IMP: NEGATIVE — SIGNIFICANT CHANGE UP
M TB IFN-G CD4+ BCKGRND COR BLD-ACNC: 0.03 IU/ML — SIGNIFICANT CHANGE UP
M TB IFN-G CD4+CD8+ BCKGRND COR BLD-ACNC: 0.01 IU/ML — SIGNIFICANT CHANGE UP
QUANT TB PLUS MITOGEN MINUS NIL: 9.75 IU/ML — SIGNIFICANT CHANGE UP

## 2018-07-07 PROCEDURE — 87186 SC STD MICRODIL/AGAR DIL: CPT

## 2018-07-07 PROCEDURE — 71275 CT ANGIOGRAPHY CHEST: CPT

## 2018-07-07 PROCEDURE — 99285 EMERGENCY DEPT VISIT HI MDM: CPT | Mod: 25

## 2018-07-07 PROCEDURE — 81003 URINALYSIS AUTO W/O SCOPE: CPT

## 2018-07-07 PROCEDURE — 80053 COMPREHEN METABOLIC PANEL: CPT

## 2018-07-07 PROCEDURE — 87070 CULTURE OTHR SPECIMN AEROBIC: CPT

## 2018-07-07 PROCEDURE — 93005 ELECTROCARDIOGRAM TRACING: CPT | Mod: XU

## 2018-07-07 PROCEDURE — 86850 RBC ANTIBODY SCREEN: CPT

## 2018-07-07 PROCEDURE — 99233 SBSQ HOSP IP/OBS HIGH 50: CPT

## 2018-07-07 PROCEDURE — C1729: CPT

## 2018-07-07 PROCEDURE — 85610 PROTHROMBIN TIME: CPT

## 2018-07-07 PROCEDURE — 88309 TISSUE EXAM BY PATHOLOGIST: CPT

## 2018-07-07 PROCEDURE — 86038 ANTINUCLEAR ANTIBODIES: CPT

## 2018-07-07 PROCEDURE — 93306 TTE W/DOPPLER COMPLETE: CPT

## 2018-07-07 PROCEDURE — 71045 X-RAY EXAM CHEST 1 VIEW: CPT

## 2018-07-07 PROCEDURE — 87206 SMEAR FLUORESCENT/ACID STAI: CPT

## 2018-07-07 PROCEDURE — 96375 TX/PRO/DX INJ NEW DRUG ADDON: CPT | Mod: XU

## 2018-07-07 PROCEDURE — 87015 SPECIMEN INFECT AGNT CONCNTJ: CPT

## 2018-07-07 PROCEDURE — 80048 BASIC METABOLIC PNL TOTAL CA: CPT

## 2018-07-07 PROCEDURE — 85730 THROMBOPLASTIN TIME PARTIAL: CPT

## 2018-07-07 PROCEDURE — 87116 MYCOBACTERIA CULTURE: CPT

## 2018-07-07 PROCEDURE — 71250 CT THORAX DX C-: CPT

## 2018-07-07 PROCEDURE — 71046 X-RAY EXAM CHEST 2 VIEWS: CPT

## 2018-07-07 PROCEDURE — 83735 ASSAY OF MAGNESIUM: CPT

## 2018-07-07 PROCEDURE — 96374 THER/PROPH/DIAG INJ IV PUSH: CPT | Mod: XU

## 2018-07-07 PROCEDURE — 86480 TB TEST CELL IMMUN MEASURE: CPT

## 2018-07-07 PROCEDURE — 86900 BLOOD TYPING SEROLOGIC ABO: CPT

## 2018-07-07 PROCEDURE — 32556 INSERT CATH PLEURA W/O IMAGE: CPT

## 2018-07-07 PROCEDURE — 85027 COMPLETE CBC AUTOMATED: CPT

## 2018-07-07 PROCEDURE — 86901 BLOOD TYPING SEROLOGIC RH(D): CPT

## 2018-07-07 PROCEDURE — 87389 HIV-1 AG W/HIV-1&-2 AB AG IA: CPT

## 2018-07-07 PROCEDURE — 84100 ASSAY OF PHOSPHORUS: CPT

## 2018-07-07 PROCEDURE — 87102 FUNGUS ISOLATION CULTURE: CPT

## 2018-07-07 PROCEDURE — 87040 BLOOD CULTURE FOR BACTERIA: CPT

## 2018-07-07 RX ORDER — ACETAMINOPHEN 500 MG
2 TABLET ORAL
Qty: 0 | Refills: 0 | COMMUNITY
Start: 2018-07-07

## 2018-07-07 RX ORDER — HYDROMORPHONE HYDROCHLORIDE 2 MG/ML
1 INJECTION INTRAMUSCULAR; INTRAVENOUS; SUBCUTANEOUS
Qty: 20 | Refills: 0 | OUTPATIENT
Start: 2018-07-07 | End: 2018-07-11

## 2018-07-07 RX ADMIN — HYDROMORPHONE HYDROCHLORIDE 4 MILLIGRAM(S): 2 INJECTION INTRAMUSCULAR; INTRAVENOUS; SUBCUTANEOUS at 06:30

## 2018-07-07 RX ADMIN — POLYETHYLENE GLYCOL 3350 17 GRAM(S): 17 POWDER, FOR SOLUTION ORAL at 11:47

## 2018-07-07 RX ADMIN — Medication 1 APPLICATION(S): at 05:48

## 2018-07-07 RX ADMIN — Medication 650 MILLIGRAM(S): at 11:52

## 2018-07-07 RX ADMIN — HYDROMORPHONE HYDROCHLORIDE 4 MILLIGRAM(S): 2 INJECTION INTRAMUSCULAR; INTRAVENOUS; SUBCUTANEOUS at 05:48

## 2018-07-07 NOTE — DISCHARGE NOTE ADULT - PLAN OF CARE
Recovery Resume activity as tolerated  Use incentive spirometer every hour during the day  Avoid heavy lifting   You may shower daily washing all incisions with soap and water  Please follow up with Dr Valente in 7-10 days. Call office to schedule and appointment. Suture will be removed at that time.  Follow up with infectious disease in 3 weeks. Please call for an appointment with Dr Wong.  Follow up with your primary care doctor to evaluate for Consider work up for Marfan and  homocystinuria   Smoking cessation strongly advised. Resume activity as tolerated  Use incentive spirometer every hour during the day  Avoid heavy lifting   You may shower daily washing all incisions with soap and water  Please follow up with Dr Valente in 7-10 days. Call office to schedule and appointment. Suture will be removed at that time.  Follow up with infectious disease in 3 weeks. Please call for an appointment with Dr Wong.  Follow up with your primary care doctor to evaluate for Consider work up for Marfan and  homocystinuria   Call Medical Clinic for appointment [address] [place] [address] [phone]  Smoking cessation strongly advised. Resume activity as tolerated  Use incentive spirometer every hour during the day  Avoid heavy lifting x 2 weeks.   You may shower daily washing all incisions with soap and water  Please follow up with Dr Valente in 7-10 days. Call office to schedule and appointment. Suture will be removed at that time.  Follow up with infectious disease in 3 weeks. Please call for an appointment with Dr Wong.  Follow up with your primary care doctor to evaluate for Consider work up for Marfan and  homocystinuria   Call Medical Clinic for appointment [address] [place] [address] [phone]  Smoking cessation strongly advised and avoid second hand smoke when possible.

## 2018-07-07 NOTE — PROGRESS NOTE ADULT - PROVIDER SPECIALTY LIST ADULT
CT Surgery
Infectious Disease
Thoracic Surgery
Infectious Disease

## 2018-07-07 NOTE — DISCHARGE NOTE ADULT - CARE PLAN
Principal Discharge DX:	Recurrent spontaneous pneumothorax  Goal:	Recovery  Assessment and plan of treatment:	Resume activity as tolerated  Use incentive spirometer every hour during the day  Avoid heavy lifting   You may shower daily washing all incisions with soap and water  Please follow up with Dr Valente in 7-10 days. Call office to schedule and appointment. Suture will be removed at that time.  Follow up with infectious disease in 3 weeks. Please call for an appointment with Dr Wong.  Follow up with your primary care doctor to evaluate for Consider work up for Marfan and  homocystinuria   Smoking cessation strongly advised.  Secondary Diagnosis:	Lung infection Principal Discharge DX:	Recurrent spontaneous pneumothorax  Goal:	Recovery  Assessment and plan of treatment:	Resume activity as tolerated  Use incentive spirometer every hour during the day  Avoid heavy lifting   You may shower daily washing all incisions with soap and water  Please follow up with Dr Valente in 7-10 days. Call office to schedule and appointment. Suture will be removed at that time.  Follow up with infectious disease in 3 weeks. Please call for an appointment with Dr Wong.  Follow up with your primary care doctor to evaluate for Consider work up for Marfan and  homocystinuria   Call Medical Clinic for appointment [address] [place] [id number] [phone]  Smoking cessation strongly advised.  Secondary Diagnosis:	Lung infection Principal Discharge DX:	Recurrent spontaneous pneumothorax  Goal:	Recovery  Assessment and plan of treatment:	Resume activity as tolerated  Use incentive spirometer every hour during the day  Avoid heavy lifting x 2 weeks.   You may shower daily washing all incisions with soap and water  Please follow up with Dr Valente in 7-10 days. Call office to schedule and appointment. Suture will be removed at that time.  Follow up with infectious disease in 3 weeks. Please call for an appointment with Dr Wong.  Follow up with your primary care doctor to evaluate for Consider work up for Marfan and  homocystinuria   Call Medical Clinic for appointment [address] [place] [address] [phone]  Smoking cessation strongly advised and avoid second hand smoke when possible.  Secondary Diagnosis:	Lung infection

## 2018-07-07 NOTE — DISCHARGE NOTE ADULT - CARE PROVIDERS DIRECT ADDRESSES
[email or web address]

## 2018-07-07 NOTE — DISCHARGE NOTE ADULT - PATIENT PORTAL LINK FT
You can access the [facility] Patient Portal, offered by [facility], by registering with the following website: [email or web address]

## 2018-07-07 NOTE — PROGRESS NOTE ADULT - SUBJECTIVE AND OBJECTIVE BOX
Patient is a 22y old  Male who presents with a chief complaint of Chest pain (07 Jul 2018 06:39)    Being followed by ID for ? empyema,pseudomonas     Interval history:s/p chest tube removal  feels well   No other acute events      ROS:  occ cough,no SOB,CP  No N/V/D  No abd pain  No urinary complaints  No HA  No joint or limb pain  No other complaints      Antimicrobials:    levoFLOXacin IVPB      levoFLOXacin IVPB 750 milliGRAM(s) IV Intermittent every 24 hours      other medications reviewed    Vital Signs Last 24 Hrs  T(C): 37.4 (07-07-18 @ 04:13), Max: 37.6 (07-06-18 @ 14:06)  T(F): 99.3 (07-07-18 @ 04:13), Max: 99.7 (07-06-18 @ 14:06)  HR: 62 (07-07-18 @ 04:13) (62 - 82)  BP: 112/69 (07-07-18 @ 04:13) (92/56 - 112/69)  BP(mean): --  RR: 18 (07-07-18 @ 04:13) (16 - 18)  SpO2: 96% (07-07-18 @ 04:13) (96% - 96%)    Physical Exam:    Constitutional well preserved,comfortable,pleasant    HEENT PERRLA EOMI,No pallor or icterus    No oral exudate or erythema    Neck supple no JVD or LN    Chest Good AE,L chest removed CT site -no erythema or tenderness  Occ crackles     CVS RRR S1 S2 WNl No murmur or rub or gallop    Abd soft BS normal No tenderness no masses    Ext No cyanosis clubbing or edema    IV site no erythema tenderness or discharge    Joints no swelling or LOM    CNS AAO X 3 no focal    Lab Data:                          12.5   7.6   )-----------( 336      ( 06 Jul 2018 06:27 )             36.7       07-06    137  |  102  |  14  ----------------------------<  89  4.4   |  25  |  1.01    Ca    8.7      06 Jul 2018 06:27          Culture - Blood (collected 05 Jul 2018 19:55)  Source: .Blood Blood-Peripheral  Preliminary Report (06 Jul 2018 20:01):    No growth to date.    Culture - Blood (collected 05 Jul 2018 19:55)  Source: .Blood Blood-Peripheral  Preliminary Report (06 Jul 2018 20:01):    No growth to date.    Culture - Tissue with Gram Stain (collected 04 Jul 2018 00:29)  Source: .Tissue Other, upper left lobe wedge  Gram Stain (04 Jul 2018 06:52):    Rare polymorphonuclear leukocytes seen per low power field    No organisms seen  Preliminary Report (04 Jul 2018 21:19):    Rare Pseudomonas aeruginosa  Organism: Pseudomonas aeruginosa (05 Jul 2018 16:49)  Organism: Pseudomonas aeruginosa (05 Jul 2018 16:49)      -  Amikacin: S <=8      -  Aztreonam: S <=4      -  Cefepime: S <=2      -  Ceftazidime: S 4      -  Ciprofloxacin: S <=0.5      -  Gentamicin: I 8      -  Imipenem: S <=1      -  Levofloxacin: S <=1      -  Meropenem: S <=1      -  Piperacillin/Tazobactam: S <=8      -  Tobramycin: S <=2      Method Type: LISA    Culture - Fungal, Tissue (collected 04 Jul 2018 00:29)  Source: .Tissue upper left lobe wedge  Preliminary Report (05 Jul 2018 08:34):    Testing in progress    Culture - Acid Fast - Tissue w/Smear (collected 04 Jul 2018 00:29)  Source: .Tissue Other, upper left lobe wedge    Culture - Tissue with Gram Stain (collected 04 Jul 2018 00:21)  Source: .Tissue Other, pleural biopsy  Gram Stain (04 Jul 2018 06:52):    Rare polymorphonuclear leukocytes seen per low power field    No organisms seen  Preliminary Report (04 Jul 2018 21:14):    Rare Pseudomonas aeruginosa  Organism: Pseudomonas aeruginosa (05 Jul 2018 16:54)  Organism: Pseudomonas aeruginosa (05 Jul 2018 16:54)      -  Amikacin: S <=8      -  Aztreonam: S <=4      -  Cefepime: S 4      -  Ceftazidime: S 4      -  Ciprofloxacin: S <=0.5      -  Gentamicin: S 2      -  Imipenem: S <=1      -  Levofloxacin: S <=1      -  Meropenem: S <=1      -  Piperacillin/Tazobactam: S <=8      -  Tobramycin: S <=2      Method Type: LISA    Culture - Fungal, Tissue (collected 04 Jul 2018 00:21)  Source: .Tissue pleural biopsy  Preliminary Report (05 Jul 2018 08:39):    Testing in progress    Culture - Acid Fast - Tissue w/Smear (collected 04 Jul 2018 00:21)  Source: .Tissue Other, pleural biopsy        < from: Xray Chest 1 View- PORTABLE-Routine (07.06.18 @ 09:39) >  IMPRESSION:   Left upper lung postsurgical changes with a small left hydropneumothorax.          < end of copied text >

## 2018-07-07 NOTE — DISCHARGE NOTE ADULT - OTHER SIGNIFICANT FINDINGS
VITAL SIGNS    Vital Signs Last 24 Hrs  T(C): 37.4 (07-07-18 @ 04:13), Max: 37.6 (07-06-18 @ 14:06)  T(F): 99.3 (07-07-18 @ 04:13), Max: 99.7 (07-06-18 @ 14:06)  HR: 62 (07-07-18 @ 04:13) (62 - 82)  BP: 112/69 (07-07-18 @ 04:13) (92/56 - 112/69)  RR: 18 (07-07-18 @ 04:13) (16 - 18)  SpO2: 96% (07-07-18 @ 04:13) (96% - 96%)            07-06 @ 07:01  -  07-07 @ 07:00  --------------------------------------------------------  IN: 450 mL / OUT: 1310 mL / NET: -860 mL    Daily   Admit Wt: Drug Dosing Weight  Height (cm): 172.72 (03 Jul 2018 11:18)  Weight (kg): 67.4 (03 Jul 2018 11:18)  BMI (kg/m2): 22.6 (03 Jul 2018 11:18)  BSA (m2): 1.8 (03 Jul 2018 11:18)      MEDICATIONS  acetaminophen   Tablet. 650 milliGRAM(s) Oral every 6 hours PRN  HYDROmorphone   Tablet 2 milliGRAM(s) Oral every 4 hours PRN  HYDROmorphone   Tablet 4 milliGRAM(s) Oral every 4 hours PRN  HYDROmorphone  Injectable 1 milliGRAM(s) IV Push every 4 hours PRN  levoFLOXacin IVPB      levoFLOXacin IVPB 750 milliGRAM(s) IV Intermittent every 24 hours  polyethylene glycol 3350 17 Gram(s) Oral daily  senna 2 Tablet(s) Oral at bedtime  silver sulfADIAZINE 1% Cream 1 Application(s) Topical two times a day      PHYSICAL EXAM  Subjective: NAD  Neurology: alert and oriented x 3, nonfocal, no gross deficits  CV : S1S2  VATS site c/d/i  Lungs: CTA b/l  Abdomen: soft, NT,ND, (+ )BM  :  voiding  Extremities:  -c/c/e    LABS  07-06    137  |  102  |  14  ----------------------------<  89  4.4   |  25  |  1.01    Ca    8.7      06 Jul 2018 06:27                                   12.5   7.6   )-----------( 336      ( 06 Jul 2018 06:27 )             36.7                 PAST MEDICAL & SURGICAL HISTORY:  Pneumothorax  No significant past surgical history           Conclusions:  1. Normal left ventricular systolic function. No segmental  wall motion abnormalities.  2. The right ventricle is not well visualized; grossly  preserved right ventricular systolic function. Right  ventricle appears enlarged however this may be due to  off-axis images.  3. Left pleural effusion.  *** No previous Echo exam.    < end of copied text >

## 2018-07-07 NOTE — DISCHARGE NOTE ADULT - MEDICATION SUMMARY - MEDICATIONS TO TAKE
I will START or STAY ON the medications listed below when I get home from the hospital:    acetaminophen 325 mg oral tablet  -- 2 tab(s) by mouth every 6 hours, As needed, Mild Pain (1 - 3)  -- Indication: For for moderate pain control    HYDROmorphone 2 mg oral tablet  -- 1 tab(s) by mouth every 6 hours, As Needed -Mild Pain (1 - 3) MDD:four tabs  -- Indication: For for severe pain control    silver sulfADIAZINE 1% topical cream  -- 1 application on skin 2 times a day  -- Indication: For for blistered tape irritation to right flank    Levaquin 750 mg oral tablet  -- 1 tab(s) by mouth every 24 hours   -- Avoid prolonged or excessive exposure to direct and/or artificial sunlight while taking this medication.  Do not take dairy products, antacids, or iron preparations within one hour of this medication.  Finish all this medication unless otherwise directed by prescriber.  May cause drowsiness or dizziness.  Medication should be taken with plenty of water.    -- Indication: For Pneumonia I will START or STAY ON the medications listed below when I get home from the hospital:    Chest x ray  -- Dx: status post left VATS Bleb resection /PNA    PA/LAT view r/o pneumothorax/ infiltrates/effusion  -- Indication: For chest xray    acetaminophen 325 mg oral tablet  -- 2 tab(s) by mouth every 6 hours, As needed, Mild Pain (1 - 3)  -- Indication: For for moderate pain control    HYDROmorphone 2 mg oral tablet  -- 1 tab(s) by mouth every 6 hours, As Needed -Mild Pain (1 - 3) MDD:four tabs  -- Indication: For for severe pain control    silver sulfADIAZINE 1% topical cream  -- 1 application on skin 2 times a day  -- Indication: For for blistered tape irritation to right flank    Levaquin 750 mg oral tablet  -- 1 tab(s) by mouth every 24 hours   -- Avoid prolonged or excessive exposure to direct and/or artificial sunlight while taking this medication.  Do not take dairy products, antacids, or iron preparations within one hour of this medication.  Finish all this medication unless otherwise directed by prescriber.  May cause drowsiness or dizziness.  Medication should be taken with plenty of water.    -- Indication: For Pneumonia

## 2018-07-07 NOTE — DISCHARGE NOTE ADULT - NS AS ACTIVITY OBS
No Heavy lifting/straining/Walking-Outdoors allowed/Stairs allowed/Showering allowed/Driving allowed/Walking-Indoors allowed

## 2018-07-07 NOTE — PROGRESS NOTE ADULT - ASSESSMENT
22F only PMH spontaneous pneumothorax in 2014 in [place] treated at the time with Heimlich valve who presents with left chest and back pain since yesterday.  Pnenumothorax  s/p bleb resection and VATS  Murky fluid noted intra op  cultures growing Pseudomonas  sensitivities as above   Patient with no SIRS/sepsis and prior to presentation no clinical s/s active bacterial infection  No h/o lung diaese in family  No sick contacts  Thus his presentation is very atypical  ? marfans(arm span appears to be greater though measurement is limited)  Would recommend echo and workup  Can switch coverage to levaquin  in view of above will sebas need long (?3-4 week) course though final duration to be decided by clinical and radiologic response.Can switch to po when stable and ready for DC  Will tailor plan for ID issues  per course,results.Will defer to primary team on management of other issues.  case d/w Thoracic team  Infectious Diseases Service will cover over weekend.  Please call [phone] if issues
22F only PMH spontaneous pneumothorax in 2014 in [place] treated at the time with Heimlich valve who presents with left chest and back pain since yesterday.  Pnenumothorax  s/p bleb resection and VATS  Murky fluid noted intra op  cultures growing Pseudomonas  sensitivities as above   Plan as detailed yesterday  can switch to levaquin po ,continue X total 4 week course  If Dced to follow in ID clininc in 3-4 weeks  Will tailor plan for ID issues  per course,results.Will defer to primary team on management of other issues.  case d/w Thoracic NP
22M PMH  left spontaneous pneumothorax in 2014 presents to ED 6/29 with recurrent spontaneous left ptx s/p chest tube placement in ED.   6/30 CXR demonstrated good lung reexpansion.   CT Chest No Cont   Small left-sided pneumothorax with a chest tube in place. Small biapical blebs and Bibasilar subsegmental atelectasis.  7/1 low grade fever, wbc wnl. c/w chest pt  and incentive spirometry for atelectasis on cxr
22M PMH  left spontaneous pneumothorax in 2014 presents to ED 6/29 with recurrent spontaneous left ptx s/p chest tube placement in ED.   6/30 CXR demonstrated good lung reexpansion.   CT Chest No Cont   Small left-sided pneumothorax with a chest tube in place. Small biapical blebs and Bibasilar subsegmental atelectasis.  7/1 low grade fever, wbc wnl. c/w chest pt  and incentive spirometry for atelectasis on cxr, supplement phosphate  7/2 anticipate left VATS bleb resection in am, phosphate level improved
22M PMH  left spontaneous pneumothorax in 2014 presents to ED 6/29 with recurrent spontaneous left ptx s/p chest tube placement in ED.   6/30 CXR demonstrated good lung reexpansion.   CT Chest No Cont   Small left-sided pneumothorax with a chest tube in place. Small biapical blebs and Bibasilar subsegmental atelectasis.  7/1 low grade fever, wbc wnl. c/w chest pt  and incentive spirometry for atelectasis on cxr, supplement phosphate  7/2 anticipate left VATS bleb resection in am, phosphate level improved  7/3 s/p left VATS REILLY blebectomy  CTA done post op to r/u dissection and mass	  7/4  pain mgmt optimized
22M PMH  left spontaneous pneumothorax in 2014 presents to ED 6/29 with recurrent spontaneous left ptx s/p chest tube placement in ED.   6/30 CXR demonstrated good lung reexpansion.   CT Chest No Cont   Small left-sided pneumothorax with a chest tube in place. Small biapical blebs and Bibasilar subsegmental atelectasis.  7/1 low grade fever, wbc wnl. c/w chest pt  and incentive spirometry for atelectasis on cxr, supplement phosphate  7/2 anticipate left VATS bleb resection in am, phosphate level improved  7/3 s/p left VATS REILLY blebectomy  CTA done post op to r/u dissection and mass	  7/4  pain mgmt optimized
22M PMH  left spontaneous pneumothorax in 2014 presents to ED 6/29 with recurrent spontaneous left ptx s/p chest tube placement in ED.   6/30 CXR demonstrated good lung reexpansion.   CT Chest No Cont   Small left-sided pneumothorax with a chest tube in place. Small biapical blebs and Bibasilar subsegmental atelectasis.  7/1 low grade fever, wbc wnl. c/w chest pt  and incentive spirometry for atelectasis on cxr, supplement phosphate  7/2 anticipate left VATS bleb resection in am, phosphate level improved  7/3 s/p left VATS REILLY blebectomy  with mechanical pleurodesis  07/03/2018   Thin, brown-tinged pleural effusion encountered. [place] of left upper lobe adherent to visceral pleural and pulsatile mass  CTA done post op to r/u dissection and mass negative PE, mass and dissection  7/4  pain mgmt optimized   7/6 OR cultures + PSA . Abx changed to levofloxacin 750mg IV QD  Echo pending.
22M PMH  left spontaneous pneumothorax in 2014 presents to ED 6/29 with recurrent spontaneous left ptx s/p chest tube placement in ED.   6/30 CXR demonstrated good lung reexpansion.   CT Chest No Cont   Small left-sided pneumothorax with a chest tube in place. Small biapical blebs and Bibasilar subsegmental atelectasis.

## 2018-07-07 NOTE — DISCHARGE NOTE ADULT - CARE PROVIDER_API CALL
Femi Valente), Thoracic Surgery  [id number] [place] [address]  [place] [address]  Phone: [phone]  Fax: [phone]    Thang Prather), Infectious Disease; Internal Medicine  [address] [place] [address]  Phone: [phone]  Fax: [phone]

## 2018-07-07 NOTE — DISCHARGE NOTE ADULT - HOSPITAL COURSE
Rare Pseudomonas aeruginosa 22M PMH  left spontaneous pneumothorax in 2014 presents to ED 6/29 with recurrent spontaneous left ptx s/p chest tube placement in ED.   6/30 CXR demonstrated good lung reexpansion.   CT Chest No Cont   Small left-sided pneumothorax with a chest tube in place. Small biapical blebs and Bibasilar subsegmental atelectasis.  7/1 low grade fever, wbc wnl. c/w chest pt  and incentive spirometry for atelectasis on cxr, supplement phosphate  7/2 anticipate left VATS bleb resection in am, phosphate level improved  7/3 s/p left VATS REILLY blebectomy  with mechanical pleurodesis  07/03/2018   Thin, brown-tinged pleural effusion encountered. [place] of left upper lobe adherent to visceral pleural and pulsatile mass  CTA done post op to r/u dissection and mass negative PE, mass and dissection  7/4  pain mgmt optimized   7/6 OR cultures +Rare Pseudomonas aeruginosa. Abx changed to levofloxacin 750mg IV QD   Echo Mitral Valve: Normal mitral valve.  Aortic Valve/Aorta: Normal trileaflet aortic valve.  Aortic Root: 2.7 cm.  Left Atrium: Normal left atrium.  LA volume index = 27  cc/m2.  7/7 pt discharged home CXR wnl

## 2018-07-08 LAB
CULTURE RESULTS: SIGNIFICANT CHANGE UP
CULTURE RESULTS: SIGNIFICANT CHANGE UP
ORGANISM # SPEC MICROSCOPIC CNT: SIGNIFICANT CHANGE UP
SPECIMEN SOURCE: SIGNIFICANT CHANGE UP
SPECIMEN SOURCE: SIGNIFICANT CHANGE UP

## 2018-07-10 LAB
CULTURE RESULTS: SIGNIFICANT CHANGE UP
CULTURE RESULTS: SIGNIFICANT CHANGE UP
SPECIMEN SOURCE: SIGNIFICANT CHANGE UP
SPECIMEN SOURCE: SIGNIFICANT CHANGE UP
SURGICAL PATHOLOGY STUDY: SIGNIFICANT CHANGE UP

## 2018-07-15 ENCOUNTER — FORM ENCOUNTER (OUTPATIENT)
Age: 23
End: 2018-07-15

## 2018-07-16 ENCOUNTER — APPOINTMENT (OUTPATIENT)
Dept: RADIOLOGY | Facility: HOSPITAL | Age: 23
End: 2018-07-16

## 2018-07-16 ENCOUNTER — OUTPATIENT (OUTPATIENT)
Dept: OUTPATIENT SERVICES | Facility: HOSPITAL | Age: 23
LOS: 1 days | End: 2018-07-16
Payer: MEDICAID

## 2018-07-16 ENCOUNTER — APPOINTMENT (OUTPATIENT)
Dept: THORACIC SURGERY | Facility: CLINIC | Age: 23
End: 2018-07-16
Payer: MEDICAID

## 2018-07-16 VITALS
RESPIRATION RATE: 16 BRPM | HEART RATE: 69 BPM | SYSTOLIC BLOOD PRESSURE: 119 MMHG | BODY MASS INDEX: 20.92 KG/M2 | DIASTOLIC BLOOD PRESSURE: 71 MMHG | HEIGHT: 68 IN | OXYGEN SATURATION: 100 % | TEMPERATURE: 97.7 F | WEIGHT: 138 LBS

## 2018-07-16 DIAGNOSIS — Z82.3 FAMILY HISTORY OF STROKE: ICD-10-CM

## 2018-07-16 DIAGNOSIS — J93.83 OTHER PNEUMOTHORAX: ICD-10-CM

## 2018-07-16 DIAGNOSIS — Z87.891 PERSONAL HISTORY OF NICOTINE DEPENDENCE: ICD-10-CM

## 2018-07-16 DIAGNOSIS — Z83.3 FAMILY HISTORY OF DIABETES MELLITUS: ICD-10-CM

## 2018-07-16 PROCEDURE — 71046 X-RAY EXAM CHEST 2 VIEWS: CPT | Mod: 26

## 2018-07-16 PROCEDURE — 99024 POSTOP FOLLOW-UP VISIT: CPT

## 2018-07-19 ENCOUNTER — APPOINTMENT (OUTPATIENT)
Dept: INFECTIOUS DISEASE | Facility: CLINIC | Age: 23
End: 2018-07-19
Payer: MEDICAID

## 2018-07-19 VITALS
HEART RATE: 91 BPM | WEIGHT: 135 LBS | BODY MASS INDEX: 20.46 KG/M2 | DIASTOLIC BLOOD PRESSURE: 72 MMHG | RESPIRATION RATE: 18 BRPM | OXYGEN SATURATION: 98 % | HEIGHT: 68 IN | SYSTOLIC BLOOD PRESSURE: 117 MMHG | TEMPERATURE: 97.6 F

## 2018-07-19 PROCEDURE — 99214 OFFICE O/P EST MOD 30 MIN: CPT

## 2018-07-30 ENCOUNTER — APPOINTMENT (OUTPATIENT)
Dept: THORACIC SURGERY | Facility: CLINIC | Age: 23
End: 2018-07-30

## 2018-07-30 DIAGNOSIS — J93.83 OTHER PNEUMOTHORAX: ICD-10-CM

## 2018-07-30 DIAGNOSIS — J86.9 PYOTHORAX W/OUT FISTULA: ICD-10-CM

## 2018-08-01 LAB
CULTURE RESULTS: SIGNIFICANT CHANGE UP
CULTURE RESULTS: SIGNIFICANT CHANGE UP
SPECIMEN SOURCE: SIGNIFICANT CHANGE UP
SPECIMEN SOURCE: SIGNIFICANT CHANGE UP

## 2018-08-02 ENCOUNTER — TRANSCRIPTION ENCOUNTER (OUTPATIENT)
Age: 23
End: 2018-08-02

## 2018-09-06 ENCOUNTER — FORM ENCOUNTER (OUTPATIENT)
Age: 23
End: 2018-09-06

## 2018-09-07 ENCOUNTER — APPOINTMENT (OUTPATIENT)
Dept: THORACIC SURGERY | Facility: CLINIC | Age: 23
End: 2018-09-07
Payer: SELF-PAY

## 2018-09-07 ENCOUNTER — OUTPATIENT (OUTPATIENT)
Dept: OUTPATIENT SERVICES | Facility: HOSPITAL | Age: 23
LOS: 1 days | End: 2018-09-07
Payer: SELF-PAY

## 2018-09-07 ENCOUNTER — APPOINTMENT (OUTPATIENT)
Dept: RADIOLOGY | Facility: HOSPITAL | Age: 23
End: 2018-09-07

## 2018-09-07 VITALS
HEART RATE: 61 BPM | HEIGHT: 68 IN | RESPIRATION RATE: 16 BRPM | SYSTOLIC BLOOD PRESSURE: 130 MMHG | OXYGEN SATURATION: 99 % | TEMPERATURE: 98.2 F | WEIGHT: 135 LBS | DIASTOLIC BLOOD PRESSURE: 85 MMHG | BODY MASS INDEX: 20.46 KG/M2

## 2018-09-07 DIAGNOSIS — Z09 ENCOUNTER FOR FOLLOW-UP EXAMINATION AFTER COMPLETED TREATMENT FOR CONDITIONS OTHER THAN MALIGNANT NEOPLASM: ICD-10-CM

## 2018-09-07 DIAGNOSIS — J93.83 OTHER PNEUMOTHORAX: ICD-10-CM

## 2018-09-07 PROCEDURE — 99024 POSTOP FOLLOW-UP VISIT: CPT

## 2018-09-07 PROCEDURE — 71046 X-RAY EXAM CHEST 2 VIEWS: CPT | Mod: 26

## 2018-09-07 RX ORDER — LEVOFLOXACIN 750 MG/1
750 TABLET, FILM COATED ORAL
Refills: 0 | Status: COMPLETED | COMMUNITY
End: 2018-09-07

## 2019-07-09 ENCOUNTER — TRANSCRIPTION ENCOUNTER (OUTPATIENT)
Age: 24
End: 2019-07-09

## 2022-10-19 NOTE — ED PROVIDER NOTE - CROS ED ROS STATEMENT
[Time Spent: ___ minutes] : I have spent [unfilled] minutes of time on the encounter.
all other ROS negative except as per HPI
